# Patient Record
Sex: FEMALE | Race: OTHER | NOT HISPANIC OR LATINO
[De-identification: names, ages, dates, MRNs, and addresses within clinical notes are randomized per-mention and may not be internally consistent; named-entity substitution may affect disease eponyms.]

---

## 2017-02-17 ENCOUNTER — RECORD ABSTRACTING (OUTPATIENT)
Age: 27
End: 2017-02-17

## 2017-02-17 DIAGNOSIS — Z86.79 PERSONAL HISTORY OF OTHER DISEASES OF THE CIRCULATORY SYSTEM: ICD-10-CM

## 2017-02-17 DIAGNOSIS — Z87.59 PERSONAL HISTORY OF OTHER COMPLICATIONS OF PREGNANCY, CHILDBIRTH AND THE PUERPERIUM: ICD-10-CM

## 2017-02-17 DIAGNOSIS — Z82.49 FAMILY HISTORY OF ISCHEMIC HEART DISEASE AND OTHER DISEASES OF THE CIRCULATORY SYSTEM: ICD-10-CM

## 2017-02-17 DIAGNOSIS — O09.899 SUPERVISION OF OTHER HIGH RISK PREGNANCIES, UNSPECIFIED TRIMESTER: ICD-10-CM

## 2017-02-17 DIAGNOSIS — Z86.2 PERSONAL HISTORY OF DISEASES OF THE BLOOD AND BLOOD-FORMING ORGANS AND CERTAIN DISORDERS INVOLVING THE IMMUNE MECHANISM: ICD-10-CM

## 2017-02-17 DIAGNOSIS — Z78.9 OTHER SPECIFIED HEALTH STATUS: ICD-10-CM

## 2023-11-13 ENCOUNTER — APPOINTMENT (OUTPATIENT)
Dept: LAB | Facility: CLINIC | Age: 33
End: 2023-11-13

## 2023-11-13 DIAGNOSIS — Z23 ENCOUNTER FOR IMMUNIZATION: ICD-10-CM

## 2023-11-13 LAB — RUBV IGG SERPL IA-ACNC: 51 IU/ML

## 2023-11-13 PROCEDURE — 36415 COLL VENOUS BLD VENIPUNCTURE: CPT

## 2023-11-13 PROCEDURE — 86762 RUBELLA ANTIBODY: CPT

## 2023-11-13 PROCEDURE — 86765 RUBEOLA ANTIBODY: CPT

## 2023-11-14 LAB — MEV IGG SER QL IA: ABNORMAL

## 2023-12-01 ENCOUNTER — OCCMED (OUTPATIENT)
Dept: URGENT CARE | Facility: CLINIC | Age: 33
End: 2023-12-01

## 2023-12-01 DIAGNOSIS — Z23 NEED FOR IMMUNIZATION AGAINST COMBINATION OF INFECTIOUS DISEASES: Primary | ICD-10-CM

## 2024-10-12 ENCOUNTER — HOSPITAL ENCOUNTER (INPATIENT)
Facility: HOSPITAL | Age: 34
LOS: 3 days | Discharge: LEFT BEFORE DISCHARGE | DRG: 560 | End: 2024-10-15
Attending: STUDENT IN AN ORGANIZED HEALTH CARE EDUCATION/TRAINING PROGRAM | Admitting: STUDENT IN AN ORGANIZED HEALTH CARE EDUCATION/TRAINING PROGRAM
Payer: COMMERCIAL

## 2024-10-12 DIAGNOSIS — O14.10 SEVERE PRE-ECLAMPSIA, ANTEPARTUM: ICD-10-CM

## 2024-10-12 DIAGNOSIS — F41.9 ANXIETY: ICD-10-CM

## 2024-10-12 DIAGNOSIS — A53.0 POSITIVE RPR TEST: ICD-10-CM

## 2024-10-12 DIAGNOSIS — O09.30 NO PRENATAL CARE IN CURRENT PREGNANCY: Primary | ICD-10-CM

## 2024-10-12 DIAGNOSIS — A53.9 SYPHILIS: ICD-10-CM

## 2024-10-12 PROBLEM — Z87.59 HISTORY OF GESTATIONAL HYPERTENSION: Status: ACTIVE | Noted: 2024-10-12

## 2024-10-12 PROBLEM — R03.0 ELEVATED BLOOD PRESSURE READING WITHOUT DIAGNOSIS OF HYPERTENSION: Status: ACTIVE | Noted: 2024-10-12

## 2024-10-12 LAB
ABO GROUP BLD: NORMAL
ALBUMIN SERPL BCG-MCNC: 3.5 G/DL (ref 3.5–5)
ALP SERPL-CCNC: 154 U/L (ref 34–104)
ALT SERPL W P-5'-P-CCNC: 12 U/L (ref 7–52)
ANION GAP SERPL CALCULATED.3IONS-SCNC: 12 MMOL/L (ref 4–13)
AST SERPL W P-5'-P-CCNC: 14 U/L (ref 13–39)
BACTERIA UR QL AUTO: ABNORMAL /HPF
BILIRUB SERPL-MCNC: 0.59 MG/DL (ref 0.2–1)
BILIRUB UR QL STRIP: NEGATIVE
BLD GP AB SCN SERPL QL: NEGATIVE
BUN SERPL-MCNC: 8 MG/DL (ref 5–25)
CALCIUM SERPL-MCNC: 8.8 MG/DL (ref 8.4–10.2)
CHLORIDE SERPL-SCNC: 103 MMOL/L (ref 96–108)
CLARITY UR: CLEAR
CO2 SERPL-SCNC: 17 MMOL/L (ref 21–32)
COLOR UR: ABNORMAL
CREAT SERPL-MCNC: 0.48 MG/DL (ref 0.6–1.3)
ERYTHROCYTE [DISTWIDTH] IN BLOOD BY AUTOMATED COUNT: 14.3 % (ref 11.6–15.1)
FERRITIN SERPL-MCNC: 12 NG/ML (ref 11–307)
GFR SERPL CREATININE-BSD FRML MDRD: 128 ML/MIN/1.73SQ M
GLUCOSE P FAST SERPL-MCNC: 130 MG/DL (ref 65–99)
GLUCOSE SERPL-MCNC: 130 MG/DL (ref 65–140)
GLUCOSE UR STRIP-MCNC: ABNORMAL MG/DL
HCT VFR BLD AUTO: 32.9 % (ref 34.8–46.1)
HGB BLD-MCNC: 11.3 G/DL (ref 11.5–15.4)
HGB UR QL STRIP.AUTO: ABNORMAL
KETONES UR STRIP-MCNC: ABNORMAL MG/DL
LEUKOCYTE ESTERASE UR QL STRIP: NEGATIVE
MCH RBC QN AUTO: 25.1 PG (ref 26.8–34.3)
MCHC RBC AUTO-ENTMCNC: 34.3 G/DL (ref 31.4–37.4)
MCV RBC AUTO: 73 FL (ref 82–98)
NITRITE UR QL STRIP: NEGATIVE
NON-SQ EPI CELLS URNS QL MICRO: ABNORMAL /HPF
PH UR STRIP.AUTO: 5.5 [PH]
PLATELET # BLD AUTO: 322 THOUSANDS/UL (ref 149–390)
PMV BLD AUTO: 10.2 FL (ref 8.9–12.7)
POTASSIUM SERPL-SCNC: 3.7 MMOL/L (ref 3.5–5.3)
PROT SERPL-MCNC: 6.8 G/DL (ref 6.4–8.4)
PROT UR STRIP-MCNC: ABNORMAL MG/DL
RBC # BLD AUTO: 4.5 MILLION/UL (ref 3.81–5.12)
RBC #/AREA URNS AUTO: ABNORMAL /HPF
RH BLD: NEGATIVE
RUBV IGG SERPL IA-ACNC: 84.1 IU/ML
SODIUM SERPL-SCNC: 132 MMOL/L (ref 135–147)
SP GR UR STRIP.AUTO: 1.01 (ref 1–1.03)
SPECIMEN EXPIRATION DATE: NORMAL
UROBILINOGEN UR STRIP-ACNC: <2 MG/DL
WBC # BLD AUTO: 13.36 THOUSAND/UL (ref 4.31–10.16)
WBC #/AREA URNS AUTO: ABNORMAL /HPF

## 2024-10-12 PROCEDURE — 86803 HEPATITIS C AB TEST: CPT

## 2024-10-12 PROCEDURE — 86706 HEP B SURFACE ANTIBODY: CPT

## 2024-10-12 PROCEDURE — 87389 HIV-1 AG W/HIV-1&-2 AB AG IA: CPT

## 2024-10-12 PROCEDURE — 86850 RBC ANTIBODY SCREEN: CPT

## 2024-10-12 PROCEDURE — 86901 BLOOD TYPING SEROLOGIC RH(D): CPT

## 2024-10-12 PROCEDURE — 59414 DELIVER PLACENTA: CPT | Performed by: STUDENT IN AN ORGANIZED HEALTH CARE EDUCATION/TRAINING PROGRAM

## 2024-10-12 PROCEDURE — 86593 SYPHILIS TEST NON-TREP QUANT: CPT

## 2024-10-12 PROCEDURE — 86900 BLOOD TYPING SEROLOGIC ABO: CPT

## 2024-10-12 PROCEDURE — 88307 TISSUE EXAM BY PATHOLOGIST: CPT | Performed by: PATHOLOGY

## 2024-10-12 PROCEDURE — 87150 DNA/RNA AMPLIFIED PROBE: CPT | Performed by: STUDENT IN AN ORGANIZED HEALTH CARE EDUCATION/TRAINING PROGRAM

## 2024-10-12 PROCEDURE — 86592 SYPHILIS TEST NON-TREP QUAL: CPT

## 2024-10-12 PROCEDURE — 87340 HEPATITIS B SURFACE AG IA: CPT

## 2024-10-12 PROCEDURE — 86780 TREPONEMA PALLIDUM: CPT

## 2024-10-12 PROCEDURE — 87591 N.GONORRHOEAE DNA AMP PROB: CPT

## 2024-10-12 PROCEDURE — 85027 COMPLETE CBC AUTOMATED: CPT

## 2024-10-12 PROCEDURE — 87086 URINE CULTURE/COLONY COUNT: CPT

## 2024-10-12 PROCEDURE — 81001 URINALYSIS AUTO W/SCOPE: CPT

## 2024-10-12 PROCEDURE — 80053 COMPREHEN METABOLIC PANEL: CPT

## 2024-10-12 PROCEDURE — 99222 1ST HOSP IP/OBS MODERATE 55: CPT | Performed by: STUDENT IN AN ORGANIZED HEALTH CARE EDUCATION/TRAINING PROGRAM

## 2024-10-12 PROCEDURE — 87491 CHLMYD TRACH DNA AMP PROBE: CPT

## 2024-10-12 PROCEDURE — 86762 RUBELLA ANTIBODY: CPT

## 2024-10-12 PROCEDURE — 82728 ASSAY OF FERRITIN: CPT

## 2024-10-12 RX ORDER — MAGNESIUM SULFATE HEPTAHYDRATE 40 MG/ML
4 INJECTION, SOLUTION INTRAVENOUS ONCE
Status: COMPLETED | OUTPATIENT
Start: 2024-10-12 | End: 2024-10-13

## 2024-10-12 RX ORDER — SODIUM CHLORIDE, SODIUM LACTATE, POTASSIUM CHLORIDE, CALCIUM CHLORIDE 600; 310; 30; 20 MG/100ML; MG/100ML; MG/100ML; MG/100ML
50 INJECTION, SOLUTION INTRAVENOUS CONTINUOUS
Status: DISCONTINUED | OUTPATIENT
Start: 2024-10-12 | End: 2024-10-15

## 2024-10-12 RX ORDER — MAGNESIUM SULFATE HEPTAHYDRATE 40 MG/ML
2 INJECTION, SOLUTION INTRAVENOUS ONCE
Status: COMPLETED | OUTPATIENT
Start: 2024-10-12 | End: 2024-10-13

## 2024-10-12 RX ORDER — IBUPROFEN 600 MG/1
600 TABLET, FILM COATED ORAL EVERY 6 HOURS
Status: DISCONTINUED | OUTPATIENT
Start: 2024-10-12 | End: 2024-10-15 | Stop reason: HOSPADM

## 2024-10-12 RX ORDER — OXYTOCIN 10 [USP'U]/ML
10 INJECTION, SOLUTION INTRAMUSCULAR; INTRAVENOUS ONCE
Status: COMPLETED | OUTPATIENT
Start: 2024-10-12 | End: 2024-10-12

## 2024-10-12 RX ORDER — ACETAMINOPHEN 325 MG/1
650 TABLET ORAL EVERY 4 HOURS PRN
Status: DISCONTINUED | OUTPATIENT
Start: 2024-10-12 | End: 2024-10-15

## 2024-10-12 RX ORDER — OXYTOCIN 10 [USP'U]/ML
INJECTION, SOLUTION INTRAMUSCULAR; INTRAVENOUS
Status: DISCONTINUED
Start: 2024-10-12 | End: 2024-10-12

## 2024-10-12 RX ORDER — DIPHENHYDRAMINE HCL 25 MG
25 TABLET ORAL EVERY 6 HOURS PRN
Status: DISCONTINUED | OUTPATIENT
Start: 2024-10-12 | End: 2024-10-15 | Stop reason: HOSPADM

## 2024-10-12 RX ORDER — ONDANSETRON 2 MG/ML
4 INJECTION INTRAMUSCULAR; INTRAVENOUS EVERY 8 HOURS PRN
Status: DISCONTINUED | OUTPATIENT
Start: 2024-10-12 | End: 2024-10-15 | Stop reason: HOSPADM

## 2024-10-12 RX ORDER — MAGNESIUM SULFATE HEPTAHYDRATE 40 MG/ML
2 INJECTION, SOLUTION INTRAVENOUS CONTINUOUS
Status: DISPENSED | OUTPATIENT
Start: 2024-10-12 | End: 2024-10-13

## 2024-10-12 RX ORDER — CALCIUM GLUCONATE 94 MG/ML
1 INJECTION, SOLUTION INTRAVENOUS ONCE AS NEEDED
Status: DISCONTINUED | OUTPATIENT
Start: 2024-10-12 | End: 2024-10-15 | Stop reason: HOSPADM

## 2024-10-12 RX ORDER — CALCIUM CARBONATE 500 MG/1
1000 TABLET, CHEWABLE ORAL DAILY PRN
Status: DISCONTINUED | OUTPATIENT
Start: 2024-10-12 | End: 2024-10-15 | Stop reason: HOSPADM

## 2024-10-12 RX ORDER — DOCUSATE SODIUM 100 MG/1
100 CAPSULE, LIQUID FILLED ORAL 2 TIMES DAILY
Status: DISCONTINUED | OUTPATIENT
Start: 2024-10-12 | End: 2024-10-15 | Stop reason: HOSPADM

## 2024-10-12 RX ORDER — SIMETHICONE 80 MG
80 TABLET,CHEWABLE ORAL 4 TIMES DAILY PRN
Status: DISCONTINUED | OUTPATIENT
Start: 2024-10-12 | End: 2024-10-15 | Stop reason: HOSPADM

## 2024-10-12 RX ORDER — BENZOCAINE/MENTHOL 6 MG-10 MG
1 LOZENGE MUCOUS MEMBRANE DAILY PRN
Status: DISCONTINUED | OUTPATIENT
Start: 2024-10-12 | End: 2024-10-15 | Stop reason: HOSPADM

## 2024-10-12 RX ORDER — LABETALOL HYDROCHLORIDE 5 MG/ML
20 INJECTION, SOLUTION INTRAVENOUS ONCE
Status: COMPLETED | OUTPATIENT
Start: 2024-10-12 | End: 2024-10-12

## 2024-10-12 RX ADMIN — WITCH HAZEL 1 PAD: 500 SOLUTION RECTAL; TOPICAL at 23:40

## 2024-10-12 RX ADMIN — IBUPROFEN 600 MG: 600 TABLET, FILM COATED ORAL at 19:50

## 2024-10-12 RX ADMIN — OXYTOCIN 10 UNITS: 10 INJECTION INTRAVENOUS at 17:10

## 2024-10-12 RX ADMIN — OXYTOCIN 10 UNITS: 10 INJECTION, SOLUTION INTRAMUSCULAR; INTRAVENOUS at 17:10

## 2024-10-12 RX ADMIN — MAGNESIUM SULFATE HEPTAHYDRATE 4 G: 40 INJECTION, SOLUTION INTRAVENOUS at 20:09

## 2024-10-12 RX ADMIN — BENZOCAINE AND LEVOMENTHOL 1 APPLICATION: 200; 5 SPRAY TOPICAL at 23:40

## 2024-10-12 RX ADMIN — HYDROCORTISONE 1 APPLICATION: 1 CREAM TOPICAL at 23:40

## 2024-10-12 RX ADMIN — MAGNESIUM SULFATE HEPTAHYDRATE 2 G/HR: 40 INJECTION, SOLUTION INTRAVENOUS at 20:32

## 2024-10-12 RX ADMIN — LABETALOL HYDROCHLORIDE 20 MG: 5 INJECTION, SOLUTION INTRAVENOUS at 19:46

## 2024-10-12 RX ADMIN — DOCUSATE SODIUM 100 MG: 100 CAPSULE, LIQUID FILLED ORAL at 19:50

## 2024-10-12 RX ADMIN — MAGNESIUM SULFATE HEPTAHYDRATE 2 G: 40 INJECTION, SOLUTION INTRAVENOUS at 19:55

## 2024-10-12 RX ADMIN — SODIUM CHLORIDE, SODIUM LACTATE, POTASSIUM CHLORIDE, AND CALCIUM CHLORIDE 50 ML/HR: .6; .31; .03; .02 INJECTION, SOLUTION INTRAVENOUS at 19:54

## 2024-10-12 NOTE — H&P
H & P- Obstetrics   Hermelinda Smart 34 y.o. female MRN: [id number]  Unit/Bed#: -01 Encounter: [id number]    Assessment: 34 y.o.  at Unknown admitted for S/p home birth brought by EMS with retained placenta no hemorrhage.  GBS status: Unknown   Postpartum contraception plan: Undecided    Plan:   No prenatal care in current pregnancy  Assessment & Plan  Prenatal labs ordered during admission    History of gestational hypertension  Assessment & Plan  BP monitoring  CBC, CMP, UPC ordered during admission    *  (spontaneous vaginal delivery)  Assessment & Plan  Continue routine post partum care  Encourage ambulation  Encourage breastfeeding  Contraception: Undecided  Anticipate discharge PPD 1 vs 2             Discussed case and plan w/ Dr. Lawrence      Chief Complaint: s/p Home birth    HPI: Hermelinda Smart is a 34 y.o.  with an JOSE of Not found. at Unknown who is being admitted for s/p home birth brought by EMS with retained placenta no hemorrhage.. She endorses that she didn't know she was pregnant, has  history of two previous vaginal deliveries and pre eclampsia and abnormal glucose during pregnancies .     Patient Active Problem List   Diagnosis    History of gestational hypertension    Severe preeclampsia    No prenatal care in current pregnancy     (spontaneous vaginal delivery)       Baby complications/comments: unknown    Review of Systems   Constitutional:  Negative for chills and fever.   HENT:  Negative for ear pain and sore throat.    Eyes:  Negative for pain and visual disturbance.   Respiratory:  Negative for cough and shortness of breath.    Cardiovascular:  Negative for chest pain and palpitations.   Gastrointestinal:  Negative for abdominal pain, nausea and vomiting.   Genitourinary:  Negative for dysuria, hematuria and vaginal bleeding.   Musculoskeletal:  Negative for arthralgias and back pain.   Skin:  Negative for color change and rash.   Neurological:  Negative for  seizures, syncope and headaches.   All other systems reviewed and are negative.      OB Hx:  OB History    Para Term  AB Living   1             SAB IAB Ectopic Multiple Live Births                  # Outcome Date GA Lbr Adolfo/2nd Weight Sex Type Anes PTL Lv   1 Current                Past Medical Hx:  Past Medical History:   Diagnosis Date    Hypertension        Past Surgical hx:  History reviewed. No pertinent surgical history.    Social Hx:  Alcohol use: No  Tobacco use: No   Other substance use: No    No medications prior to admission.    Objective:  Temp:  [98.2 °F (36.8 °C)] 98.2 °F (36.8 °C)  HR:  [] 95  BP: (131-186)/() 143/72  Resp:  [18] 18  O2 Device: None (Room air)  Body mass index is 38.59 kg/m².     Physical Exam:  Physical Exam  Constitutional:       Appearance: Normal appearance.   Genitourinary:      Vulva normal.   Cardiovascular:      Rate and Rhythm: Normal rate and regular rhythm.   Pulmonary:      Effort: Pulmonary effort is normal. No respiratory distress.   Abdominal:      Palpations: Abdomen is soft.      Tenderness: There is no abdominal tenderness.   Musculoskeletal:      Cervical back: Normal range of motion.   Neurological:      Mental Status: She is alert.   Skin:     General: Skin is warm and dry.   Psychiatric:         Mood and Affect: Mood normal.         Behavior: Behavior normal.   Vitals reviewed.          Lab Results   Component Value Date    WBC 13.36 (H) 10/12/2024    HGB 11.3 (L) 10/12/2024    HCT 32.9 (L) 10/12/2024     10/12/2024     Lab Results   Component Value Date    K 3.7 10/12/2024     10/12/2024    CO2 17 (L) 10/12/2024    BUN 8 10/12/2024    CREATININE 0.48 (L) 10/12/2024    AST 14 10/12/2024    ALT 12 10/12/2024     Prenatal Labs: Reviewed      Blood type: A negative  Antibody: Negative  GBS: Unknown  HIV: Unknown  Rubella: Unknown  Syphilis IgM/IgG: Unknown  HBsAg: Unknown  HCAb: Unknown  Chlamydia: Unknown  Gonorrhea:  Unknown  Diabetes 1 hour screen: Unknown  3 hour glucose: Unknown  Platelets: 322  Hgb: 11.3  <2 Midnights  INPATIENT     Signature/Title: Jasmin Vargas MD  Date: 10/12/2024  Time: 9:55 PM

## 2024-10-12 NOTE — PROGRESS NOTES
Notified by RN of sustained severe range BP, c/w new diagnosis of PreE w/ SF.  Currently she denies headache, vision changes, chest pain, SOB, RUQ/epigastric pain, or increased swelling.  HR 94 bpm.  Will give Labetalol 20 mg IV once now and start Magnesium gtt for seizure ppx with labs (CBC, CMP, Mag) q6h (next due at midnight).    Reviewed w/ Dr. Howard Ryan MD  OBGYN Resident  10/12/24  7:34 PM

## 2024-10-13 LAB
ABO GROUP BLD: NORMAL
ABO GROUP BLD: NORMAL
ALBUMIN SERPL BCG-MCNC: 3.2 G/DL (ref 3.5–5)
ALBUMIN SERPL BCG-MCNC: 3.4 G/DL (ref 3.5–5)
ALBUMIN SERPL BCG-MCNC: 3.4 G/DL (ref 3.5–5)
ALP SERPL-CCNC: 127 U/L (ref 34–104)
ALP SERPL-CCNC: 129 U/L (ref 34–104)
ALP SERPL-CCNC: 147 U/L (ref 34–104)
ALT SERPL W P-5'-P-CCNC: 11 U/L (ref 7–52)
ALT SERPL W P-5'-P-CCNC: 12 U/L (ref 7–52)
ALT SERPL W P-5'-P-CCNC: 9 U/L (ref 7–52)
ANION GAP SERPL CALCULATED.3IONS-SCNC: 11 MMOL/L (ref 4–13)
ANION GAP SERPL CALCULATED.3IONS-SCNC: 11 MMOL/L (ref 4–13)
ANION GAP SERPL CALCULATED.3IONS-SCNC: 8 MMOL/L (ref 4–13)
AST SERPL W P-5'-P-CCNC: 16 U/L (ref 13–39)
AST SERPL W P-5'-P-CCNC: 16 U/L (ref 13–39)
AST SERPL W P-5'-P-CCNC: 17 U/L (ref 13–39)
BILIRUB SERPL-MCNC: 0.58 MG/DL (ref 0.2–1)
BILIRUB SERPL-MCNC: 0.72 MG/DL (ref 0.2–1)
BILIRUB SERPL-MCNC: 0.75 MG/DL (ref 0.2–1)
BLD GP AB SCN SERPL QL: NEGATIVE
BUN SERPL-MCNC: 6 MG/DL (ref 5–25)
BUN SERPL-MCNC: 6 MG/DL (ref 5–25)
BUN SERPL-MCNC: 8 MG/DL (ref 5–25)
CALCIUM ALBUM COR SERPL-MCNC: 8.2 MG/DL (ref 8.3–10.1)
CALCIUM ALBUM COR SERPL-MCNC: 8.3 MG/DL (ref 8.3–10.1)
CALCIUM ALBUM COR SERPL-MCNC: 9.1 MG/DL (ref 8.3–10.1)
CALCIUM SERPL-MCNC: 7.7 MG/DL (ref 8.4–10.2)
CALCIUM SERPL-MCNC: 7.7 MG/DL (ref 8.4–10.2)
CALCIUM SERPL-MCNC: 8.6 MG/DL (ref 8.4–10.2)
CHLORIDE SERPL-SCNC: 104 MMOL/L (ref 96–108)
CHLORIDE SERPL-SCNC: 105 MMOL/L (ref 96–108)
CHLORIDE SERPL-SCNC: 105 MMOL/L (ref 96–108)
CO2 SERPL-SCNC: 15 MMOL/L (ref 21–32)
CO2 SERPL-SCNC: 18 MMOL/L (ref 21–32)
CO2 SERPL-SCNC: 20 MMOL/L (ref 21–32)
CREAT SERPL-MCNC: 0.49 MG/DL (ref 0.6–1.3)
CREAT SERPL-MCNC: 0.51 MG/DL (ref 0.6–1.3)
CREAT SERPL-MCNC: 0.55 MG/DL (ref 0.6–1.3)
ERYTHROCYTE [DISTWIDTH] IN BLOOD BY AUTOMATED COUNT: 14.3 % (ref 11.6–15.1)
ERYTHROCYTE [DISTWIDTH] IN BLOOD BY AUTOMATED COUNT: 14.4 % (ref 11.6–15.1)
ERYTHROCYTE [DISTWIDTH] IN BLOOD BY AUTOMATED COUNT: 14.5 % (ref 11.6–15.1)
FETAL CELL SCN BLD QL ROSETTE: NEGATIVE
GFR SERPL CREATININE-BSD FRML MDRD: 122 ML/MIN/1.73SQ M
GFR SERPL CREATININE-BSD FRML MDRD: 125 ML/MIN/1.73SQ M
GFR SERPL CREATININE-BSD FRML MDRD: 127 ML/MIN/1.73SQ M
GLUCOSE SERPL-MCNC: 110 MG/DL (ref 65–140)
GLUCOSE SERPL-MCNC: 129 MG/DL (ref 65–140)
GLUCOSE SERPL-MCNC: 136 MG/DL (ref 65–140)
HBV SURFACE AB SER-ACNC: 5.74 MIU/ML
HBV SURFACE AG SER QL: NORMAL
HCT VFR BLD AUTO: 30.4 % (ref 34.8–46.1)
HCT VFR BLD AUTO: 31.6 % (ref 34.8–46.1)
HCT VFR BLD AUTO: 31.9 % (ref 34.8–46.1)
HCV AB SER QL: NORMAL
HGB BLD-MCNC: 10.6 G/DL (ref 11.5–15.4)
HGB BLD-MCNC: 10.8 G/DL (ref 11.5–15.4)
HGB BLD-MCNC: 10.8 G/DL (ref 11.5–15.4)
HIV 1+2 AB+HIV1 P24 AG SERPL QL IA: NORMAL
HIV 1+2 AB+HIV1 P24 AG SERPL QL IA: NORMAL
HIV 2 AB SERPL QL IA: NORMAL
HIV1 AB SERPL QL IA: NORMAL
HIV1 P24 AG SER QL: NORMAL
HIV1 P24 AG SERPL QL IA: NORMAL
MAGNESIUM SERPL-MCNC: 4 MG/DL (ref 1.9–2.7)
MAGNESIUM SERPL-MCNC: 4.6 MG/DL (ref 1.9–2.7)
MAGNESIUM SERPL-MCNC: 4.7 MG/DL (ref 1.9–2.7)
MCH RBC QN AUTO: 24.8 PG (ref 26.8–34.3)
MCH RBC QN AUTO: 24.9 PG (ref 26.8–34.3)
MCH RBC QN AUTO: 25.2 PG (ref 26.8–34.3)
MCHC RBC AUTO-ENTMCNC: 33.9 G/DL (ref 31.4–37.4)
MCHC RBC AUTO-ENTMCNC: 34.2 G/DL (ref 31.4–37.4)
MCHC RBC AUTO-ENTMCNC: 34.9 G/DL (ref 31.4–37.4)
MCV RBC AUTO: 72 FL (ref 82–98)
MCV RBC AUTO: 73 FL (ref 82–98)
MCV RBC AUTO: 74 FL (ref 82–98)
PLATELET # BLD AUTO: 319 THOUSANDS/UL (ref 149–390)
PLATELET # BLD AUTO: 346 THOUSANDS/UL (ref 149–390)
PLATELET # BLD AUTO: 359 THOUSANDS/UL (ref 149–390)
PMV BLD AUTO: 10.2 FL (ref 8.9–12.7)
PMV BLD AUTO: 10.7 FL (ref 8.9–12.7)
PMV BLD AUTO: 10.8 FL (ref 8.9–12.7)
POTASSIUM SERPL-SCNC: 3.9 MMOL/L (ref 3.5–5.3)
POTASSIUM SERPL-SCNC: 4 MMOL/L (ref 3.5–5.3)
POTASSIUM SERPL-SCNC: 4 MMOL/L (ref 3.5–5.3)
PROT SERPL-MCNC: 6.5 G/DL (ref 6.4–8.4)
PROT SERPL-MCNC: 6.6 G/DL (ref 6.4–8.4)
PROT SERPL-MCNC: 6.7 G/DL (ref 6.4–8.4)
RBC # BLD AUTO: 4.2 MILLION/UL (ref 3.81–5.12)
RBC # BLD AUTO: 4.34 MILLION/UL (ref 3.81–5.12)
RBC # BLD AUTO: 4.36 MILLION/UL (ref 3.81–5.12)
RH BLD: NEGATIVE
RH BLD: NEGATIVE
SODIUM SERPL-SCNC: 131 MMOL/L (ref 135–147)
SODIUM SERPL-SCNC: 133 MMOL/L (ref 135–147)
SODIUM SERPL-SCNC: 133 MMOL/L (ref 135–147)
TREPONEMA PALLIDUM IGG+IGM AB [PRESENCE] IN SERUM OR PLASMA BY IMMUNOASSAY: REACTIVE
WBC # BLD AUTO: 11.08 THOUSAND/UL (ref 4.31–10.16)
WBC # BLD AUTO: 11.94 THOUSAND/UL (ref 4.31–10.16)
WBC # BLD AUTO: 9.03 THOUSAND/UL (ref 4.31–10.16)

## 2024-10-13 PROCEDURE — 80053 COMPREHEN METABOLIC PANEL: CPT

## 2024-10-13 PROCEDURE — 85461 HEMOGLOBIN FETAL: CPT

## 2024-10-13 PROCEDURE — 86780 TREPONEMA PALLIDUM: CPT

## 2024-10-13 PROCEDURE — 87806 HIV AG W/HIV1&2 ANTB W/OPTIC: CPT

## 2024-10-13 PROCEDURE — 83735 ASSAY OF MAGNESIUM: CPT

## 2024-10-13 PROCEDURE — 85027 COMPLETE CBC AUTOMATED: CPT

## 2024-10-13 PROCEDURE — 99232 SBSQ HOSP IP/OBS MODERATE 35: CPT | Performed by: OBSTETRICS & GYNECOLOGY

## 2024-10-13 PROCEDURE — 86901 BLOOD TYPING SEROLOGIC RH(D): CPT

## 2024-10-13 PROCEDURE — 86900 BLOOD TYPING SEROLOGIC ABO: CPT

## 2024-10-13 PROCEDURE — 86850 RBC ANTIBODY SCREEN: CPT

## 2024-10-13 RX ORDER — LABETALOL HYDROCHLORIDE 5 MG/ML
40 INJECTION, SOLUTION INTRAVENOUS ONCE
Status: COMPLETED | OUTPATIENT
Start: 2024-10-13 | End: 2024-10-13

## 2024-10-13 RX ORDER — NIFEDIPINE 30 MG/1
30 TABLET, EXTENDED RELEASE ORAL DAILY
Status: DISCONTINUED | OUTPATIENT
Start: 2024-10-13 | End: 2024-10-14

## 2024-10-13 RX ORDER — LABETALOL HYDROCHLORIDE 5 MG/ML
20 INJECTION, SOLUTION INTRAVENOUS ONCE
Status: COMPLETED | OUTPATIENT
Start: 2024-10-13 | End: 2024-10-13

## 2024-10-13 RX ADMIN — IBUPROFEN 600 MG: 600 TABLET, FILM COATED ORAL at 03:40

## 2024-10-13 RX ADMIN — DOCUSATE SODIUM 100 MG: 100 CAPSULE, LIQUID FILLED ORAL at 18:21

## 2024-10-13 RX ADMIN — IBUPROFEN 600 MG: 600 TABLET, FILM COATED ORAL at 13:49

## 2024-10-13 RX ADMIN — HUMAN RHO(D) IMMUNE GLOBULIN 300 MCG: 300 INJECTION, SOLUTION INTRAMUSCULAR at 06:43

## 2024-10-13 RX ADMIN — MAGNESIUM SULFATE HEPTAHYDRATE 2 G/HR: 40 INJECTION, SOLUTION INTRAVENOUS at 05:45

## 2024-10-13 RX ADMIN — LABETALOL HYDROCHLORIDE 40 MG: 5 INJECTION, SOLUTION INTRAVENOUS at 22:07

## 2024-10-13 RX ADMIN — IBUPROFEN 600 MG: 600 TABLET, FILM COATED ORAL at 21:13

## 2024-10-13 RX ADMIN — NIFEDIPINE 30 MG: 30 TABLET, FILM COATED, EXTENDED RELEASE ORAL at 21:13

## 2024-10-13 RX ADMIN — LABETALOL HYDROCHLORIDE 20 MG: 5 INJECTION, SOLUTION INTRAVENOUS at 20:53

## 2024-10-13 RX ADMIN — MAGNESIUM SULFATE HEPTAHYDRATE 2 G/HR: 40 INJECTION, SOLUTION INTRAVENOUS at 14:47

## 2024-10-13 RX ADMIN — SODIUM CHLORIDE, SODIUM LACTATE, POTASSIUM CHLORIDE, AND CALCIUM CHLORIDE 50 ML/HR: .6; .31; .03; .02 INJECTION, SOLUTION INTRAVENOUS at 14:46

## 2024-10-13 NOTE — PROGRESS NOTES
"  Progress Note - OB/GYN   Name: Hermelinda Smart 34 y.o. female I MRN: [id number]  Unit/Bed#: -01 I Date of Admission: 10/12/2024   Date of Service: 10/13/2024 I Hospital Day: 1     Assessment & Plan   (spontaneous vaginal delivery)  Continue routine post partum care  Encourage ambulation  Encourage breastfeeding  Contraception: Undecided  Anticipate discharge PPD 1 vs 2     History of gestational hypertension  BP monitoring  CBC, CMP, UPC ordered during admission  Severe preeclampsia  Mag started at 1555 10/12/2024  BP monitoring  No prenatal care in current pregnancy  Prenatal labs ordered during admission    Progress Note - OB/GYN   Hermelinda Smart 34 y.o. female MRN: [id number]  Unit/Bed#: -01 Encounter: [id number]    Assessment:  Post partum Day #01 s/p extramural , stable, baby in the room. Patient is on Mag for 24 hours, ends at 1555 10/13/2024.    Subjective/Objective   Chief Complaint:     Post delivery. Patient is doing well. Lochia WNL. Pain well controlled.     Subjective:     Pain: yes, cramping, improved with meds  Tolerating PO: yes  Voiding: yes  Flatus: no  Ambulating: yes  Chest pain: no  Shortness of breath: no  Leg pain: no  Lochia: minimal    Objective:     Vitals: /70   Pulse 86   Temp 98.2 °F (36.8 °C) (Oral)   Resp 18   Ht 5' 2\" (1.575 m)   Wt 95.7 kg (211 lb)   Breastfeeding No   BMI 38.59 kg/m²     I/O         10/11 0701  10/12 0700 10/12 0701  10/13 07    Urine (mL/kg/hr)  600    Total Output  600    Net  -600                  Lab Results   Component Value Date    WBC 13.36 (H) 10/12/2024    HGB 11.3 (L) 10/12/2024    HCT 32.9 (L) 10/12/2024    MCV 73 (L) 10/12/2024     10/12/2024       Physical Exam:     Gen: AAOx3, NAD  CV: no acute distress  Lungs: no acute distress  Abd: Soft, non-tender, non-distended, no rebound or guarding  Uterine fundus firm and non-tender, 1 cm below the umbilicus.  Ext: Non tender    Inaldo Ryan Alicia, " MD [facility] PGY-1  10/13/2024  12:03 AM

## 2024-10-13 NOTE — CASE MANAGEMENT
Case Management Progress Note    Patient name Hermelinda Smart  Location /-01 MRN [id number]  : 1990 Date 10/13/2024       LOS (days): 1  Geometric Mean LOS (GMLOS) (days):   Days to GMLOS:        OBJECTIVE:        Current admission status: Inpatient  Preferred Pharmacy: No Pharmacies Listed  Primary Care Provider: No primary care provider on file.    Primary Insurance: [employer] [facility]  Secondary Insurance:     PROGRESS NOTE:        CM met with MOB to introduce CM services, complete assessment, and provide CM contact info.      MOB reported the following:    Assessment:  Consult reason: Inadequate Prenatal Care  Gestational Age at Birth: 37 Weeks + 0 Days  MOB Name (& age if teen):   Hermelinda Smart  FOB Name (& age if teen MOB):   [place]   Other Legal Guardian(s) for Baby:      Other Children:   2 other children  Housing Plan/Lives with:   MOB, FOB, and children  Insurance Coverage/Plan for Baby: MOB verbalizes that they will contact their insurance to add baby ASAP.   Support System: Family, Friends, and Spouse/Significant Other  Care Items: Car Seat, Crib/Bassinet (Safe Sleep Space), Diapers/Wipes, and Clothing Family getting car seat today  Method of Feeding: Formula  Breast Pump: Declines need for breast pump  Government Assistance Programs: SNAP (Supplemental Nutrition Assistance Program)   Arrangements:   Current Employment/Schooling: MOB employed Part Time and FOB employed Full Time  Mental Health History and/or Treatment:   Denies  Substance Use History and/or Treatment: Denies    Urine Drug Screen Results: Not Applicable  Children & Youth History: None  Current Legal Issues: N/A  Domestic/Intimate Partner Violence History: Denies.  NICU Resources: N/A    Discharge Plan:  Pediatrician:   [facility] [place]  Prenatal/ Care:  TBD  Follow-Up Appointments Needed/Scheduled: TBD  Medications/DME/Other Referrals: TBD  Transportation Plan: Family has a  vehicle    Follow-Up Needed from Care Management:         Cm met with MOB to review Cm role. MOB did not know she was pregnant so did not receive prenatal care. MOB plans on taking baby to [facility] in [place]  for prenatal care- no appointment scheduled yet. MOB denies any need for services or resources. She reports having all necessary items for baby. Family is going to get car seat today. She denies any needs or concerns at this time. MOB and baby cleared of CM needs at this time.

## 2024-10-13 NOTE — QUICK NOTE
Hermelinda Smart is a 34 y.o.  with an JOSE of Not found. at Unknown who is being admitted for s/p home birth brought by EMS with retained placenta no hemorrhage.. She endorses that she didn't know she was pregnant, has  history of two previous vaginal deliveries and pre eclampsia and abnormal glucose during pregnancies .   Patient was in supine position, placenta was spontaneously removed. The vagina, cervix, perineum, and rectum were inspected. No laceration(s) were noted.      At the conclusion of the procedure, all needle, sponge, and instrument counts were noted to be correct. Patient tolerated the procedure well and was allowed to recover in labor and delivery room and while  is being transferred to this campus. When stable pt will go to the post-partum floor.      Dr. Lawrence was present and participated in all key portions of the case.    Jasmin Garcia  OB GYN PGY1  10/12/24  10:05 PM

## 2024-10-13 NOTE — PLAN OF CARE

## 2024-10-13 NOTE — ASSESSMENT & PLAN NOTE
Continue routine post partum care  Encourage ambulation  Encourage breastfeeding  Contraception: Undecided  Anticipate discharge PPD 1 vs 2

## 2024-10-13 NOTE — PLAN OF CARE
Problem: POSTPARTUM  Goal: Experiences normal postpartum course  Description: INTERVENTIONS:  - Monitor maternal vital signs  - Assess uterine involution and lochia  Outcome: Progressing  Goal: Appropriate maternal -  bonding  Description: INTERVENTIONS:  - Identify family support  - Assess for appropriate maternal/infant bonding   -Encourage maternal/infant bonding opportunities  - Referral to  or  as needed  Outcome: Progressing  Goal: Establishment of infant feeding pattern  Description: INTERVENTIONS:  - Assess breast/bottle feeding  - Refer to lactation as needed  Outcome: Progressing  Goal: Incision(s), wounds(s) or drain site(s) healing without S/S of infection  Description: INTERVENTIONS  - Assess and document dressing, incision, wound bed, drain sites and surrounding tissue  - Provide patient and family education  - Perform skin care/dressing changes every day  Outcome: Progressing     Problem: PAIN - ADULT  Goal: Verbalizes/displays adequate comfort level or baseline comfort level  Description: Interventions:  - Encourage patient to monitor pain and request assistance  - Assess pain using appropriate pain scale  - Administer analgesics based on type and severity of pain and evaluate response  - Implement non-pharmacological measures as appropriate and evaluate response  - Consider cultural and social influences on pain and pain management  - Notify physician/advanced practitioner if interventions unsuccessful or patient reports new pain  Outcome: Progressing     Problem: INFECTION - ADULT  Goal: Absence or prevention of progression during hospitalization  Description: INTERVENTIONS:  - Assess and monitor for signs and symptoms of infection  - Monitor lab/diagnostic results  - Monitor all insertion sites, i.e. indwelling lines, tubes, and drains  - Monitor endotracheal if appropriate and nasal secretions for changes in amount and color  - [place] appropriate  cooling/warming therapies per order  - Administer medications as ordered  - Instruct and encourage patient and family to use good hand hygiene technique  - Identify and instruct in appropriate isolation precautions for identified infection/condition  Outcome: Progressing  Goal: Absence of fever/infection during neutropenic period  Description: INTERVENTIONS:  - Monitor WBC    Outcome: Progressing     Problem: SAFETY ADULT  Goal: Patient will remain free of falls  Description: INTERVENTIONS:  - Educate patient/family on patient safety including physical limitations  - Instruct patient to call for assistance with activity   - Consult OT/PT to assist with strengthening/mobility   - Keep Call bell within reach  - Keep bed low and locked with side rails adjusted as appropriate  - Keep care items and personal belongings within reach  - Initiate and maintain comfort rounds    Outcome: Progressing  Goal: Maintain or return to baseline ADL function  Description: INTERVENTIONS:  -  Assess patient's ability to carry out ADLs; assess patient's baseline for ADL function and identify physical deficits which impact ability to perform ADLs (bathing, care of mouth/teeth, toileting, grooming, dressing, etc.)  - Assess/evaluate cause of self-care deficits   - Assess range of motion  - Assess patient's mobility; develop plan if impaired  - Assess patient's need for assistive devices and provide as appropriate  - Encourage maximum independence but intervene and supervise when necessary  - Involve family in performance of ADLs  - Assess for home care needs following discharge   - Consider OT consult to assist with ADL evaluation and planning for discharge  - Provide patient education as appropriate  Outcome: Progressing  Goal: Maintains/Returns to pre admission functional level  Description: INTERVENTIONS:  - Perform AM-PAC 6 Click Basic Mobility/ Daily Activity assessment daily.  - Set and communicate daily mobility goal to care team  and patient/family/caregiver.   - Collaborate with rehabilitation services on mobility goals if consulted    - Out of bed for toileting  - Record patient progress and toleration of activity level   Outcome: Progressing     Problem: Knowledge Deficit  Goal: Patient/family/caregiver demonstrates understanding of disease process, treatment plan, medications, and discharge instructions  Description: Complete learning assessment and assess knowledge base.  Interventions:  - Provide teaching at level of understanding  - Provide teaching via preferred learning methods  Outcome: Progressing     Problem: DISCHARGE PLANNING  Goal: Discharge to home or other facility with appropriate resources  Description: INTERVENTIONS:  - Identify barriers to discharge w/patient and caregiver  - Arrange for needed discharge resources and transportation as appropriate  - Identify discharge learning needs (meds, wound care, etc.)  - Arrange for interpretive services to assist at discharge as needed  - Refer to Case Management Department for coordinating discharge planning if the patient needs post-hospital services based on physician/advanced practitioner order or complex needs related to functional status, cognitive ability, or social support system  Outcome: Progressing     Problem: CARDIOVASCULAR - ADULT  Goal: Maintains optimal cardiac output and hemodynamic stability  Description: INTERVENTIONS:  - Monitor I/O, vital signs and rhythm  - Monitor for S/S and trends of decreased cardiac output  - Administer and titrate ordered vasoactive medications to optimize hemodynamic stability  - Assess quality of pulses, skin color and temperature    - Instruct patient to report change in severity of symptoms  Outcome: Progressing     Problem: METABOLIC, FLUID AND ELECTROLYTES - ADULT  Goal: Fluid balance maintained  Description: INTERVENTIONS:  - Monitor labs   - Monitor I/O   - Instruct patient on fluid and nutrition as appropriate  - Assess for  signs & symptoms of volume excess or deficit  Outcome: Progressing

## 2024-10-14 PROBLEM — A53.0 POSITIVE RPR TEST: Status: ACTIVE | Noted: 2024-10-14

## 2024-10-14 LAB
BACTERIA UR CULT: NORMAL
C TRACH DNA SPEC QL NAA+PROBE: NEGATIVE
GP B STREP DNA SPEC QL NAA+PROBE: POSITIVE
N GONORRHOEA DNA SPEC QL NAA+PROBE: NEGATIVE
RPR SER QL: REACTIVE
RPR SER-TITR: ABNORMAL {TITER}

## 2024-10-14 RX ORDER — LABETALOL HYDROCHLORIDE 5 MG/ML
80 INJECTION, SOLUTION INTRAVENOUS ONCE
Status: COMPLETED | OUTPATIENT
Start: 2024-10-14 | End: 2024-10-14

## 2024-10-14 RX ORDER — HYDROXYZINE HYDROCHLORIDE 25 MG/1
25 TABLET, FILM COATED ORAL EVERY 6 HOURS PRN
Status: DISCONTINUED | OUTPATIENT
Start: 2024-10-14 | End: 2024-10-15 | Stop reason: HOSPADM

## 2024-10-14 RX ORDER — NIFEDIPINE 30 MG/1
60 TABLET, EXTENDED RELEASE ORAL DAILY
Status: DISCONTINUED | OUTPATIENT
Start: 2024-10-14 | End: 2024-10-15

## 2024-10-14 RX ADMIN — NIFEDIPINE 60 MG: 30 TABLET, FILM COATED, EXTENDED RELEASE ORAL at 08:41

## 2024-10-14 RX ADMIN — LABETALOL HYDROCHLORIDE 80 MG: 5 INJECTION, SOLUTION INTRAVENOUS at 01:52

## 2024-10-14 RX ADMIN — HYDROXYZINE HYDROCHLORIDE 25 MG: 25 TABLET ORAL at 01:19

## 2024-10-14 RX ADMIN — HYDROXYZINE HYDROCHLORIDE 25 MG: 25 TABLET ORAL at 13:21

## 2024-10-14 RX ADMIN — HYDROXYZINE HYDROCHLORIDE 25 MG: 25 TABLET ORAL at 07:10

## 2024-10-14 RX ADMIN — ACETAMINOPHEN 650 MG: 325 TABLET ORAL at 01:22

## 2024-10-14 RX ADMIN — HYDROXYZINE HYDROCHLORIDE 25 MG: 25 TABLET ORAL at 20:36

## 2024-10-14 NOTE — PROGRESS NOTES
"Progress Note - OB/GYN  Hermelinda Smart 34 y.o. female MRN: [id number]  Unit/Bed#: -01 Encounter: [id number]    Assessment and Plan   Hermelinda Smart is a patient of: [facility]. She is PPD2 s/p spontaneous vaginal delivery. Recovering well and stable.    *  (spontaneous vaginal delivery)  Assessment & Plan  S/p home delivery with delivery of placenta in hospital; patient did not know she was pregnant. Patient progressing toward postpartum milestones  - Continue routine postpartum care  - Pain management with oral analgesics  - Encourage familial bonding  - Contraception: undecided      Positive RPR test  Assessment & Plan  - f/u FTA Ab    No prenatal care in current pregnancy  Assessment & Plan  S/p case management consult    Severe preeclampsia  Assessment & Plan  CBC/CMP wnl  Systolic (12hrs), Av , Min:90 , Max:213   Diastolic (12hrs), Av, Min:50, Max:118  - s/p magnesium sulfate x24hrs postpartum  - current med regimen: Procardia 60mg qd  - continue to monitor BP  - continue to monitor for preeclampsia symptoms        Disposition   - Anticipate discharge home on PPD2-3    Subjective/Objective   Chief Complaint: PPD2 s/p spontaneous vaginal delivery  Subjective:    Hermelinda Smart has no current complaints. Pain is well controlled. She is currently voiding. She is ambulating. She is tolerating PO and denies nausea or vomitting. She denies chest pain, shortness of breath, lightheadedness, headaches, visual disturbance, or RUQ pain. Lochia is normal.     Vitals:   /69 (BP Location: Right arm)   Pulse 78   Temp 98.8 °F (37.1 °C) (Oral)   Resp 16   Ht 5' 2\" (1.575 m)   Wt 95.7 kg (211 lb)   LMP  (Approximate)   SpO2 100%   Breastfeeding No   BMI 38.59 kg/m²       Intake/Output Summary (Last 24 hours) at 10/14/2024 0624  Last data filed at 10/13/2024 1615  Gross per 24 hour   Intake 1634.16 ml   Output 650 ml   Net 984.16 ml     Invasive Devices       Peripheral " Intravenous Line  Duration             Peripheral IV 10/12/24 Left Antecubital 1 day    Peripheral IV 10/13/24 Left;Lower;Ventral (anterior) Arm <1 day                  Physical Exam:   GEN: well-appearing, alert and oriented x3  CARDIO: regular rate  RESP: nonlabored respirations on room air  ABDOMEN: soft, nontender, nodistended, fundus firm below the umbilicus  LOWER EXTREMITIES: nontender, no erythema, trace edema bilaterally    Labs:   Hemoglobin   Date Value Ref Range Status   10/13/2024 10.6 (L) 11.5 - 15.4 g/dL Final   10/13/2024 10.8 (L) 11.5 - 15.4 g/dL Final     WBC   Date Value Ref Range Status   10/13/2024 9.03 4.31 - 10.16 Thousand/uL Final   10/13/2024 11.08 (H) 4.31 - 10.16 Thousand/uL Final     Platelets   Date Value Ref Range Status   10/13/2024 359 149 - 390 Thousands/uL Final   10/13/2024 346 149 - 390 Thousands/uL Final     Creatinine   Date Value Ref Range Status   10/13/2024 0.55 (L) 0.60 - 1.30 mg/dL Final     Comment:     Standardized to IDMS reference method   10/13/2024 0.49 (L) 0.60 - 1.30 mg/dL Final     Comment:     Standardized to IDMS reference method     AST   Date Value Ref Range Status   10/13/2024 16 13 - 39 U/L Final   10/13/2024 17 13 - 39 U/L Final     ALT   Date Value Ref Range Status   10/13/2024 12 7 - 52 U/L Final     Comment:     Specimen collection should occur prior to Sulfasalazine administration due to the potential for falsely depressed results.    10/13/2024 9 7 - 52 U/L Final     Comment:     Specimen collection should occur prior to Sulfasalazine administration due to the potential for falsely depressed results.           Héctor Villa MD  OBGYN PGY-1  10/14/24  6:24 AM

## 2024-10-14 NOTE — PLAN OF CARE
Problem: POSTPARTUM  Goal: Experiences normal postpartum course  Description: INTERVENTIONS:  - Monitor maternal vital signs  - Assess uterine involution and lochia  Outcome: Progressing  Goal: Appropriate maternal -  bonding  Description: INTERVENTIONS:  - Identify family support  - Assess for appropriate maternal/infant bonding   -Encourage maternal/infant bonding opportunities  - Referral to  or  as needed  Outcome: Progressing  Goal: Establishment of infant feeding pattern  Description: INTERVENTIONS:  - Assess breast/bottle feeding  - Refer to lactation as needed  Outcome: Progressing  Goal: Incision(s), wounds(s) or drain site(s) healing without S/S of infection  Description: INTERVENTIONS  - Assess and document dressing, incision, wound bed, drain sites and surrounding tissue  - Provide patient and family education  - Perform skin care/dressing changes every   Outcome: Progressing     Problem: PAIN - ADULT  Goal: Verbalizes/displays adequate comfort level or baseline comfort level  Description: Interventions:  - Encourage patient to monitor pain and request assistance  - Assess pain using appropriate pain scale  - Administer analgesics based on type and severity of pain and evaluate response  - Implement non-pharmacological measures as appropriate and evaluate response  - Consider cultural and social influences on pain and pain management  - Notify physician/advanced practitioner if interventions unsuccessful or patient reports new pain  Outcome: Progressing     Problem: INFECTION - ADULT  Goal: Absence or prevention of progression during hospitalization  Description: INTERVENTIONS:  - Assess and monitor for signs and symptoms of infection  - Monitor lab/diagnostic results  - Monitor all insertion sites, i.e. indwelling lines, tubes, and drains  - Monitor endotracheal if appropriate and nasal secretions for changes in amount and color  - [place] appropriate cooling/warming  therapies per order  - Administer medications as ordered  - Instruct and encourage patient and family to use good hand hygiene technique  - Identify and instruct in appropriate isolation precautions for identified infection/condition  Outcome: Progressing  Goal: Absence of fever/infection during neutropenic period  Description: INTERVENTIONS:  - Monitor WBC    Outcome: Progressing     Problem: SAFETY ADULT  Goal: Patient will remain free of falls  Description: INTERVENTIONS:  - Educate patient/family on patient safety including physical limitations  - Instruct patient to call for assistance with activity   - Consult OT/PT to assist with strengthening/mobility   - Keep Call bell within reach  - Keep bed low and locked with side rails adjusted as appropriate  - Keep care items and personal belongings within reach  - Initiate and maintain comfort rounds  - Make Fall Risk Sign visible to staff  - Offer Toileting every  Hours, in advance of need  - Initiate/Maintain alarm  - Obtain necessary fall risk management equipment:   - Apply yellow socks and bracelet for high fall risk patients  - Consider moving patient to room near nurses station  Outcome: Progressing  Goal: Maintain or return to baseline ADL function  Description: INTERVENTIONS:  -  Assess patient's ability to carry out ADLs; assess patient's baseline for ADL function and identify physical deficits which impact ability to perform ADLs (bathing, care of mouth/teeth, toileting, grooming, dressing, etc.)  - Assess/evaluate cause of self-care deficits   - Assess range of motion  - Assess patient's mobility; develop plan if impaired  - Assess patient's need for assistive devices and provide as appropriate  - Encourage maximum independence but intervene and supervise when necessary  - Involve family in performance of ADLs  - Assess for home care needs following discharge   - Consider OT consult to assist with ADL evaluation and planning for discharge  - Provide  patient education as appropriate  Outcome: Progressing  Goal: Maintains/Returns to pre admission functional level  Description: INTERVENTIONS:  - Perform AM-PAC 6 Click Basic Mobility/ Daily Activity assessment daily.  - Set and communicate daily mobility goal to care team and patient/family/caregiver.   - Collaborate with rehabilitation services on mobility goals if consulted  - Perform Range of Motion times a day  - Reposition patient every  hours.  - Dangle patient  times a day  - Stand patient  times a day  - Ambulate patient  times a day  - Out of bed to chair  times a day   - Out of bed for meals  times a day  - Out of bed for toileting  - Record patient progress and toleration of activity level   Outcome: Progressing     Problem: Knowledge Deficit  Goal: Patient/family/caregiver demonstrates understanding of disease process, treatment plan, medications, and discharge instructions  Description: Complete learning assessment and assess knowledge base.  Interventions:  - Provide teaching at level of understanding  - Provide teaching via preferred learning methods  Outcome: Progressing     Problem: DISCHARGE PLANNING  Goal: Discharge to home or other facility with appropriate resources  Description: INTERVENTIONS:  - Identify barriers to discharge w/patient and caregiver  - Arrange for needed discharge resources and transportation as appropriate  - Identify discharge learning needs (meds, wound care, etc.)  - Arrange for interpretive services to assist at discharge as needed  - Refer to Case Management Department for coordinating discharge planning if the patient needs post-hospital services based on physician/advanced practitioner order or complex needs related to functional status, cognitive ability, or social support system  Outcome: Progressing     Problem: CARDIOVASCULAR - ADULT  Goal: Maintains optimal cardiac output and hemodynamic stability  Description: INTERVENTIONS:  - Monitor I/O, vital signs and  rhythm  - Monitor for S/S and trends of decreased cardiac output  - Administer and titrate ordered vasoactive medications to optimize hemodynamic stability  - Assess quality of pulses, skin color and temperature  - Assess for signs of decreased coronary artery perfusion  - Instruct patient to report change in severity of symptoms  Outcome: Progressing     Problem: METABOLIC, FLUID AND ELECTROLYTES - ADULT  Goal: Fluid balance maintained  Description: INTERVENTIONS:  - Monitor labs   - Monitor I/O and WT  - Instruct patient on fluid and nutrition as appropriate  - Assess for signs & symptoms of volume excess or deficit  Outcome: Progressing

## 2024-10-14 NOTE — UTILIZATION REVIEW
"NOTIFICATION OF INPATIENT ADMISSION   MATERNITY/DELIVERY AUTHORIZATION REQUEST   SERVICING FACILITY:   [facility]  [facility] Child [facility] - L&D, [place], NICU  [address] [place] [address]  Tax ID: [id number]  NPI: [id number]   ATTENDING PROVIDER:  Attending Name and NPI#: Mar Farrell Md [[id number]]  Address: [address] [place] [address]  Phone: [phone]   ADMISSION INFORMATION:  Place of Service: Inpatient [facility]  Place of Service Code: 21  Inpatient Admission Date/Time: 10/12/24  5:01 PM  Discharge Date/Time: No discharge date for patient encounter.  Admitting Diagnosis Code/Description:   (spontaneous vaginal delivery) [O80]     Mother: Hermelinda Smart 1990 Estimated Date of Delivery: 24  Delivering clinician:     OB History          3    Para   3    Term   3            AB        Living   3         SAB        IAB        Ectopic        Multiple        Live Births   3                Name & MRN:   Information for the patient's :  Damaris Smart Boy (Hermelinda) [[id number]]    Delivery Information:  Sex: male  Delivered 10/12/2024 4:00 PM by ; Gestational Age: 37w0d    [place] Measurements:  Weight: 5 lb 13.7 oz (2655 g);  Height: 18\"    APGAR 1 minute 5 minutes 10 minutes   Totals:         UTILIZATION REVIEW CONTACT:  Sruthi Irizarry Utilization   Network Utilization Review Department  Phone: [phone]  Fax [phone]  Email: [email or web address]  Contact for approvals/pending authorizations, clinical reviews, and discharge.     PHYSICIAN ADVISORY SERVICES:  Medical Necessity Denial & Sqfm-td-Tdlx Review  Phone: [phone]  Fax: [phone]  Email: [email or web address]     DISCHARGE SUPPORT TEAM:  For Patients Discharge Needs & Updates  Phone: [phone] opt. 2 Fax: [phone]  Email: [email or web address]      "

## 2024-10-14 NOTE — ASSESSMENT & PLAN NOTE
S/p home delivery with delivery of placenta in hospital; patient did not know she was pregnant. Patient progressing toward postpartum milestones  - Continue routine postpartum care  - Pain management with oral analgesics  - Encourage familial bonding  - Contraception: undecided

## 2024-10-14 NOTE — QUICK NOTE
Present at the bedside to discuss Hermelinda's blood pressures. Called by nursing due to severe range blood pressures that they were just notified of, which are listed below:     169/100, 184/100, 213/114    I was notified of these blood pressures. Patient was acutely treated with 20mg of IV labetalol. Discussed with patient regarding starting 30mg of Procardia XL PO. She denies vision changes, upper abdominal pain, and significant edema. She does endorse a mild headache and feeling flushed since her 24hr of magnesium was discontinued.     Patient is agreeable to plan. She is also requesting something for anxiety. Will rx atarax.    Will continue to treat SRBP if needed. Plan discussed with Dr. Jimenez.     Mulu Crawford MD  OBGYN PGY-4  10/13/24 9:27 PM

## 2024-10-14 NOTE — PLAN OF CARE
Problem: POSTPARTUM  Goal: Experiences normal postpartum course  Description: INTERVENTIONS:  - Monitor maternal vital signs  - Assess uterine involution and lochia  Outcome: Progressing  Goal: Appropriate maternal -  bonding  Description: INTERVENTIONS:  - Identify family support  - Assess for appropriate maternal/infant bonding   -Encourage maternal/infant bonding opportunities  - Referral to  or  as needed  Outcome: Progressing  Goal: Establishment of infant feeding pattern  Description: INTERVENTIONS:  - Assess breast/bottle feeding  - Refer to lactation as needed  Outcome: Progressing  Goal: Incision(s), wounds(s) or drain site(s) healing without S/S of infection  Description: INTERVENTIONS  - Assess and document dressing, incision, wound bed, drain sites and surrounding tissue  - Provide patient and family education  Outcome: Progressing     Problem: PAIN - ADULT  Goal: Verbalizes/displays adequate comfort level or baseline comfort level  Description: Interventions:  - Encourage patient to monitor pain and request assistance  - Assess pain using appropriate pain scale  - Administer analgesics based on type and severity of pain and evaluate response  - Implement non-pharmacological measures as appropriate and evaluate response  - Consider cultural and social influences on pain and pain management  - Notify physician/advanced practitioner if interventions unsuccessful or patient reports new pain  Outcome: Progressing     Problem: INFECTION - ADULT  Goal: Absence or prevention of progression during hospitalization  Description: INTERVENTIONS:  - Assess and monitor for signs and symptoms of infection  - Monitor lab/diagnostic results  - Monitor all insertion sites, i.e. indwelling lines, tubes, and drains  - Monitor endotracheal if appropriate and nasal secretions for changes in amount and color  - [place] appropriate cooling/warming therapies per order  - Administer medications  as ordered  - Instruct and encourage patient and family to use good hand hygiene technique  - Identify and instruct in appropriate isolation precautions for identified infection/condition  Outcome: Progressing  Goal: Absence of fever/infection during neutropenic period  Description: INTERVENTIONS:  - Monitor WBC    Outcome: Progressing     Problem: SAFETY ADULT  Goal: Patient will remain free of falls  Description: INTERVENTIONS:  - Educate patient/family on patient safety including physical limitations  - Instruct patient to call for assistance with activity   - Consult OT/PT to assist with strengthening/mobility   - Keep Call bell within reach  - Keep bed low and locked with side rails adjusted as appropriate  - Keep care items and personal belongings within reach  - Initiate and maintain comfort rounds  - Make Fall Risk Sign visible to staff  - Apply yellow socks and bracelet for high fall risk patients  - Consider moving patient to room near nurses station  Outcome: Progressing  Goal: Maintain or return to baseline ADL function  Description: INTERVENTIONS:  -  Assess patient's ability to carry out ADLs; assess patient's baseline for ADL function and identify physical deficits which impact ability to perform ADLs (bathing, care of mouth/teeth, toileting, grooming, dressing, etc.)  - Assess/evaluate cause of self-care deficits   - Assess range of motion  - Assess patient's mobility; develop plan if impaired  - Assess patient's need for assistive devices and provide as appropriate  - Encourage maximum independence but intervene and supervise when necessary  - Involve family in performance of ADLs  - Assess for home care needs following discharge   - Consider OT consult to assist with ADL evaluation and planning for discharge  - Provide patient education as appropriate  Outcome: Progressing  Goal: Maintains/Returns to pre admission functional level  Description: INTERVENTIONS:  - Perform AM-PAC 6 Click Basic  Mobility/ Daily Activity assessment daily.  - Set and communicate daily mobility goal to care team and patient/family/caregiver.   - Collaborate with rehabilitation services on mobility goals if consulted  - Out of bed for toileting  - Record patient progress and toleration of activity level   Outcome: Progressing     Problem: Knowledge Deficit  Goal: Patient/family/caregiver demonstrates understanding of disease process, treatment plan, medications, and discharge instructions  Description: Complete learning assessment and assess knowledge base.  Interventions:  - Provide teaching at level of understanding  - Provide teaching via preferred learning methods  Outcome: Progressing     Problem: DISCHARGE PLANNING  Goal: Discharge to home or other facility with appropriate resources  Description: INTERVENTIONS:  - Identify barriers to discharge w/patient and caregiver  - Arrange for needed discharge resources and transportation as appropriate  - Identify discharge learning needs (meds, wound care, etc.)  - Arrange for interpretive services to assist at discharge as needed  - Refer to Case Management Department for coordinating discharge planning if the patient needs post-hospital services based on physician/advanced practitioner order or complex needs related to functional status, cognitive ability, or social support system  Outcome: Progressing     Problem: CARDIOVASCULAR - ADULT  Goal: Maintains optimal cardiac output and hemodynamic stability  Description: INTERVENTIONS:  - Monitor I/O, vital signs and rhythm  - Monitor for S/S and trends of decreased cardiac output  - Administer and titrate ordered vasoactive medications to optimize hemodynamic stability  - Assess quality of pulses, skin color and temperature  - Assess for signs of decreased coronary artery perfusion  - Instruct patient to report change in severity of symptoms  Outcome: Progressing     Problem: METABOLIC, FLUID AND ELECTROLYTES - ADULT  Goal: Fluid  balance maintained  Description: INTERVENTIONS:  - Monitor labs   - Monitor I/O and WT  - Instruct patient on fluid and nutrition as appropriate  - Assess for signs & symptoms of volume excess or deficit  Outcome: Progressing

## 2024-10-14 NOTE — ASSESSMENT & PLAN NOTE
CBC/CMP wnl  Systolic (12hrs), Av , Min:90 , Max:213   Diastolic (12hrs), Av, Min:50, Max:118  - s/p magnesium sulfate x24hrs postpartum  - current med regimen: Procardia 60mg qd  - continue to monitor BP  - continue to monitor for preeclampsia symptoms

## 2024-10-15 VITALS
RESPIRATION RATE: 18 BRPM | BODY MASS INDEX: 38.83 KG/M2 | WEIGHT: 211 LBS | DIASTOLIC BLOOD PRESSURE: 80 MMHG | TEMPERATURE: 99 F | OXYGEN SATURATION: 100 % | SYSTOLIC BLOOD PRESSURE: 154 MMHG | HEART RATE: 125 BPM | HEIGHT: 62 IN

## 2024-10-15 PROBLEM — Z87.59 HISTORY OF GESTATIONAL HYPERTENSION: Status: RESOLVED | Noted: 2024-10-12 | Resolved: 2024-10-15

## 2024-10-15 PROBLEM — F41.9 ANXIETY: Status: ACTIVE | Noted: 2024-10-15

## 2024-10-15 PROBLEM — A53.9 SYPHILIS: Status: ACTIVE | Noted: 2024-10-15

## 2024-10-15 LAB — SL AMB TREPONEMA PALLIDUM ANTIBODIES: REACTIVE

## 2024-10-15 PROCEDURE — 99232 SBSQ HOSP IP/OBS MODERATE 35: CPT | Performed by: OBSTETRICS & GYNECOLOGY

## 2024-10-15 PROCEDURE — NC001 PR NO CHARGE: Performed by: OBSTETRICS & GYNECOLOGY

## 2024-10-15 RX ORDER — ACETAMINOPHEN 325 MG/1
650 TABLET ORAL EVERY 4 HOURS PRN
Refills: 0 | Status: CANCELLED | OUTPATIENT
Start: 2024-10-15

## 2024-10-15 RX ORDER — NIFEDIPINE 30 MG/1
90 TABLET, EXTENDED RELEASE ORAL DAILY
Qty: 270 TABLET | Refills: 0 | Status: SHIPPED | OUTPATIENT
Start: 2024-10-16 | End: 2025-01-14

## 2024-10-15 RX ORDER — FUROSEMIDE 20 MG/1
20 TABLET ORAL ONCE
Status: COMPLETED | OUTPATIENT
Start: 2024-10-15 | End: 2024-10-15

## 2024-10-15 RX ORDER — ACETAMINOPHEN 325 MG/1
650 TABLET ORAL EVERY 4 HOURS PRN
Status: CANCELLED
Start: 2024-10-15

## 2024-10-15 RX ORDER — NIFEDIPINE 30 MG/1
60 TABLET, EXTENDED RELEASE ORAL DAILY
Qty: 60 TABLET | Refills: 0 | Status: CANCELLED | OUTPATIENT
Start: 2024-10-15

## 2024-10-15 RX ORDER — LABETALOL 200 MG/1
200 TABLET, FILM COATED ORAL 2 TIMES DAILY
Qty: 180 TABLET | Refills: 0 | Status: SHIPPED | OUTPATIENT
Start: 2024-10-15 | End: 2025-01-13

## 2024-10-15 RX ORDER — ACETAMINOPHEN 325 MG/1
650 TABLET ORAL EVERY 6 HOURS SCHEDULED
Start: 2024-10-16

## 2024-10-15 RX ORDER — ADHESIVE BANDAGE 3/4"
BANDAGE TOPICAL DAILY
Qty: 1 EACH | Refills: 0 | Status: SHIPPED | OUTPATIENT
Start: 2024-10-15

## 2024-10-15 RX ORDER — NIFEDIPINE 30 MG/1
90 TABLET, EXTENDED RELEASE ORAL DAILY
Status: DISCONTINUED | OUTPATIENT
Start: 2024-10-15 | End: 2024-10-15 | Stop reason: HOSPADM

## 2024-10-15 RX ORDER — HYDROXYZINE HYDROCHLORIDE 25 MG/1
25 TABLET, FILM COATED ORAL EVERY 6 HOURS PRN
Qty: 30 TABLET | Refills: 0 | Status: SHIPPED | OUTPATIENT
Start: 2024-10-15

## 2024-10-15 RX ORDER — SERTRALINE HYDROCHLORIDE 25 MG/1
25 TABLET, FILM COATED ORAL DAILY
Qty: 90 TABLET | Refills: 0 | Status: SHIPPED | OUTPATIENT
Start: 2024-10-15 | End: 2025-01-13

## 2024-10-15 RX ORDER — ACETAMINOPHEN 325 MG/1
650 TABLET ORAL EVERY 6 HOURS SCHEDULED
Status: DISCONTINUED | OUTPATIENT
Start: 2024-10-15 | End: 2024-10-15 | Stop reason: HOSPADM

## 2024-10-15 RX ORDER — LABETALOL 200 MG/1
200 TABLET, FILM COATED ORAL DAILY
Status: DISCONTINUED | OUTPATIENT
Start: 2024-10-15 | End: 2024-10-15 | Stop reason: HOSPADM

## 2024-10-15 RX ORDER — IBUPROFEN 600 MG/1
600 TABLET, FILM COATED ORAL EVERY 6 HOURS
Start: 2024-10-15

## 2024-10-15 RX ORDER — HYDROXYZINE HYDROCHLORIDE 25 MG/1
25 TABLET, FILM COATED ORAL ONCE
Status: COMPLETED | OUTPATIENT
Start: 2024-10-15 | End: 2024-10-15

## 2024-10-15 RX ADMIN — HYDROXYZINE HYDROCHLORIDE 25 MG: 25 TABLET ORAL at 03:30

## 2024-10-15 RX ADMIN — PENICILLIN G BENZATHINE 2.4 MILLION UNITS: 1200000 INJECTION, SUSPENSION INTRAMUSCULAR at 16:03

## 2024-10-15 RX ADMIN — HYDROXYZINE HYDROCHLORIDE 25 MG: 25 TABLET, FILM COATED ORAL at 13:07

## 2024-10-15 RX ADMIN — FUROSEMIDE 20 MG: 20 TABLET ORAL at 13:07

## 2024-10-15 RX ADMIN — HYDROXYZINE HYDROCHLORIDE 25 MG: 25 TABLET ORAL at 09:22

## 2024-10-15 RX ADMIN — LABETALOL HYDROCHLORIDE 200 MG: 200 TABLET, FILM COATED ORAL at 12:44

## 2024-10-15 RX ADMIN — NIFEDIPINE 90 MG: 30 TABLET, FILM COATED, EXTENDED RELEASE ORAL at 09:21

## 2024-10-15 NOTE — ASSESSMENT & PLAN NOTE
S/p home delivery with delivery of placenta in hospital; patient did not know she was pregnant. Patient progressing toward postpartum milestones  - Continue routine postpartum care  - Pain management with oral analgesics  - Encourage familial bonding  - Contraception: undecided    Discussed follow up with [facility] practice in [place]

## 2024-10-15 NOTE — ASSESSMENT & PLAN NOTE
S/p home delivery with delivery of placenta in hospital; patient did not know she was pregnant. Patient progressing toward postpartum milestones  - Continue routine postpartum care  - Pain management with oral analgesics  - Encourage familial bonding  - Contraception: undecided    Discussed follow up with [facility] practice in [place]

## 2024-10-15 NOTE — ASSESSMENT & PLAN NOTE
CBC/CMP wnl  Systolic (12hrs), Av , Min:135 , Max:190   Diastolic (12hrs), Av, Min:77, Max:97  - s/p magnesium sulfate x24hrs postpartum  - current med regimen: Procardia 60mg qd  - last SRBP early AM on 10/14  - continue to monitor BP and for preeclampsia symptoms    If discharged, will order BP cuff and need BP check in office this week

## 2024-10-15 NOTE — UTILIZATION REVIEW
Continued Stay Review    Date: 10-15-24                          Current Patient Class: inpatient  Current Level of Care: medical     HPI:34 y.o. female initially admitted on 10-12-24     Assessment/Plan: PPD # 3   175 after receiving Procardia XL 90mg this morning, continues to deny all symptoms of preeclampsia. Will start labetalol 200mg at least daily and continue to titrate BP medication    CBC/CMP wnl  Systolic (12hrs), Av , Min:124 , Max:156   Diastolic (12hrs), Av, Min:63, Max:79  - s/p magnesium sulfate x24hrs postpartum  - current med regimen: Procardia 60mg qd  ---> increased to 90 mg     Medications:   Scheduled Medications:  acetaminophen, 650 mg, Oral, Q6H BROOKE  docusate sodium, 100 mg, Oral, BID  ibuprofen, 600 mg, Oral, Q6H  labetalol, 200 mg, Oral, Daily  NIFEdipine, 90 mg, Oral, Daily  Penicillin G Benzathine, 2.4 Million Units, Intramuscular, Weekly      Continuous IV Infusions:     PRN Meds:  benzocaine-menthol-lanolin-aloe, 1 Application, Topical, Q6H PRN  calcium carbonate, 1,000 mg, Oral, Daily PRN  calcium gluconate, 1 g, Intravenous, Once PRN  diphenhydrAMINE, 25 mg, Oral, Q6H PRN  hydrocortisone, 1 Application, Topical, Daily PRN  hydrOXYzine HCL, 25 mg, Oral, Q6H PRN  ondansetron, 4 mg, Intravenous, Q8H PRN  simethicone, 80 mg, Oral, 4x Daily PRN  witch hazel-glycerin, 1 Pad, Topical, Q4H PRN      Discharge Plan: home when medically stable      Vital Signs (last 3 days)       Date/Time Temp Pulse Resp BP MAP (mmHg) SpO2 O2 Device Cardiac (WDL) Patient Position - Orthostatic VS Pain    10/15/24 1300 -- -- -- 190/90 -- -- -- -- Sitting --    10/15/24 1230 98.5 °F (36.9 °C) 119 18 175/93 -- 98 % None (Room air) -- -- No Pain    10/15/24 1022 -- 109 -- 154/77 -- -- -- -- -- --    10/15/24 0925 -- -- -- 166/78 -- -- -- -- -- --    10/15/24 0920 -- -- -- -- -- -- None (Room air) WDL -- No Pain    10/15/24 0914 -- -- -- -- -- -- -- -- -- No Pain    10/15/24 0902 98.5 °F (36.9 °C) 114 18  178/97 -- 98 % -- -- -- No Pain    10/15/24 0812 -- -- -- -- -- -- -- -- -- No Pain    10/15/24 0356 98 °F (36.7 °C) 97 18 156/77 -- 100 % -- -- -- No Pain    10/15/24 0330 -- -- -- -- -- -- -- -- -- No Pain    10/14/24 2358 98.2 °F (36.8 °C) 95 18 124/63 -- 100 % -- -- -- No Pain    10/14/24 2258 -- -- -- -- -- -- -- -- -- No Pain    10/14/24 2200 -- -- -- -- -- -- None (Room air) WDL -- --    10/14/24 1954 98.7 °F (37.1 °C) 101 18 137/79 -- -- None (Room air) -- Sitting No Pain    10/14/24 1635 98.8 °F (37.1 °C) 104 18 136/83 -- -- -- -- Sitting No Pain    10/14/24 1155 98.1 °F (36.7 °C) 101 20 143/81 -- 98 % -- -- -- No Pain    10/14/24 0930 -- 102 18 129/78 -- -- -- -- Sitting --    10/14/24 0759 -- 99 20 130/68 -- -- -- -- -- --    10/14/24 0659 98.5 °F (36.9 °C) 94 20 112/70 -- -- -- -- -- No Pain    10/14/24 0600 -- 78 -- 116/69 -- -- -- -- Lying --    10/14/24 0500 -- 84 -- 121/75 -- -- -- -- Lying --    10/14/24 0430 -- 81 -- 103/63 -- -- -- -- Lying --    10/14/24 0400 98.8 °F (37.1 °C) 80 16 112/64 -- 100 % -- -- Lying No Pain    10/14/24 0345 -- 80 -- 96/53 -- -- -- -- Lying --    10/14/24 0330 -- 90 -- 98/55 -- -- -- -- Lying --    10/14/24 0315 -- 81 -- 97/50 -- -- -- -- Lying --    10/14/24 0300 -- 85 -- 104/59 -- -- -- -- Lying --    10/14/24 0250 -- 85 -- 99/53 -- -- -- -- Lying --    10/14/24 0240 -- 87 -- 90/52 -- -- -- -- Lying --    10/14/24 0230 -- 88 -- 96/51 -- -- -- -- Lying --    10/14/24 0220 -- 88 -- 101/57 -- -- -- -- Lying --    10/14/24 0210 -- 88 -- 127/71 -- -- -- -- Lying --    10/14/24 0200 -- 92 -- 131/66 -- -- -- -- Lying --    10/14/24 0152 -- 91 -- 169/102 -- -- -- -- Lying --    10/14/24 0122 -- -- -- -- -- -- -- -- -- 2    10/14/24 0120 -- 88 -- 175/96 -- -- -- -- Lying --    10/14/24 0104 -- 95 -- 164/89 -- -- -- -- Lying --    10/14/24 0037 -- 92 -- 140/82 -- -- -- -- Lying --    10/14/24 0007 -- 87 -- 135/81 -- -- -- -- Lying --    10/13/24 2353 98.7 °F (37.1 °C) 96 17  144/91 -- 99 % -- -- Lying No Pain    10/13/24 2338 -- 92 -- 145/98 -- -- -- -- Lying --    10/13/24 2323 -- 88 -- 152/104 -- -- -- -- Lying --    10/13/24 2307 -- 90 -- 143/92 -- -- -- -- Lying --    10/13/24 2257 -- 89 -- 144/98 -- -- -- -- Lying --    10/13/24 2247 -- 91 -- 143/102 -- -- -- -- Lying --    10/13/24 2237 -- 90 -- 132/96 -- -- -- -- Lying --    10/13/24 2227 -- 89 -- 137/99 -- -- -- -- Lying --    10/13/24 2217 -- 88 -- 150/92 -- -- -- -- Lying --    10/13/24 2207 -- 92 -- 169/100 -- -- -- -- Lying --    10/13/24 2153 -- 87 -- 165/100 -- -- -- -- Lying --    10/13/24 2144 -- 88 -- 157/98 -- -- -- -- Lying --    10/13/24 2134 -- 91 -- 170/111 -- -- -- -- Lying --    10/13/24 2124 -- 91 -- 165/118 -- -- -- -- Lying --    OBSERV: RN attempting to insert second IV at this time; pt reports feeling anxious at 10/13/24 2124    10/13/24 2114 -- 93 -- 156/93 -- -- -- -- Lying --    10/13/24 2113 -- -- -- -- -- -- -- -- -- 1    10/13/24 2103 -- -- -- 166/105 -- -- -- -- Lying --    10/13/24 2053 -- 117 18 202/105 -- 100 % -- -- Sitting --    10/13/24 2030 -- -- -- 213/114 -- -- -- -- -- --    10/13/24 2015 -- -- -- 184/100 -- -- -- WDL -- --    10/13/24 2000 98.6 °F (37 °C) 98 18 169/100 -- 100 % None (Room air) -- Sitting --    10/13/24 1808 97.9 °F (36.6 °C) 94 18 146/83 -- 97 % None (Room air) -- Sitting - Orthostatic VS No Pain    10/13/24 1617 97.9 °F (36.6 °C) 100 18 133/87 -- 100 % None (Room air) -- Sitting No Pain    10/13/24 1400 98 °F (36.7 °C) 99 18 132/86 105 98 % None (Room air) -- Sitting 2    10/13/24 1349 -- -- -- -- -- -- -- -- -- 5    10/13/24 1136 98.1 °F (36.7 °C) 97 18 137/85 106 100 % None (Room air) -- Sitting No Pain    10/13/24 1000 97.5 °F (36.4 °C) 95 18 142/84 -- 100 % None (Room air) -- Sitting No Pain    10/13/24 0805 98.2 °F (36.8 °C) 104 18 148/79 -- 100 % None (Room air) WDL Sitting No Pain    OBSERV: Plan of care for today reviewed w/ pt.  Pt denies HA, visual changes, or  epigastric pain.  Plan to continue Magnesium until this afternoon and monitoring BP and bloodwork discussed. at 10/13/24 0805    10/13/24 0551 97.6 °F (36.4 °C) 92 18 133/73 -- 99 % -- -- -- --    10/13/24 0343 -- 101 18 136/82 -- 99 % -- -- -- --    10/13/24 0340 -- -- -- -- -- -- -- -- -- 3    10/13/24 0246 97.5 °F (36.4 °C) 91 18 141/79 -- 99 % -- -- -- --    10/13/24 0128 -- 83 18 132/93 -- 100 % -- -- -- --    10/13/24 0000 -- -- -- -- -- -- None (Room air) -- -- --    10/12/24 2345 -- -- -- 148/104 -- -- -- -- -- --    10/12/24 2330 -- 86 -- 143/78 -- -- -- -- -- --    10/12/24 2322 -- 86 -- 134/70 -- -- -- -- -- --    10/12/24 2312 -- 91 -- 148/83 -- -- -- -- -- --    10/12/24 2302 -- 101 -- 145/86 -- -- -- -- -- --    10/12/24 2252 -- 96 -- 133/60 -- -- -- -- -- --    10/12/24 2242 -- 96 -- 137/71 -- -- -- -- -- --    10/12/24 2232 -- 93 -- 135/72 -- -- -- -- -- --    10/12/24 2222 -- 96 -- 139/80 -- -- -- -- -- --    10/12/24 2212 -- 98 -- 140/84 -- -- -- -- -- --    10/12/24 2200 -- -- -- -- -- -- None (Room air) WDL -- 2    10/12/24 2052 -- 95 -- 143/72 -- -- -- -- -- --    10/12/24 2043 -- 97 -- 150/71 -- -- -- -- -- --    10/12/24 2032 -- 96 -- 137/77 -- -- -- -- -- --    10/12/24 2022 -- 93 -- 135/79 -- -- -- -- -- --    10/12/24 2012 -- 93 -- 133/78 -- -- -- -- -- --    10/12/24 2002 -- 96 -- 131/82 -- -- None (Room air) -- -- --    10/12/24 1952 -- 50 -- 164/88 -- -- -- -- -- --    10/12/24 1950 -- -- -- -- -- -- -- -- -- 5    10/12/24 1942 -- 103 -- 165/89 -- -- -- -- -- --    10/12/24 1932 -- 100 -- 166/89 -- -- -- -- -- --    10/12/24 1929 -- 103 -- 177/89 -- -- -- -- -- --    10/12/24 1922 -- 103 -- 186/111 -- -- -- -- -- --    10/12/24 1912 -- 97 -- 151/88 -- -- -- -- -- --    10/12/24 1902 -- 104 -- 156/94 -- -- -- -- -- --    10/12/24 1830 -- 94 -- 162/92 -- -- -- -- -- --    10/12/24 1818 -- -- -- 158/93 -- -- -- -- -- --    10/12/24 1815 -- -- -- 158/93 -- -- -- -- -- --    10/12/24 1752 --  -- -- 149/89 -- -- -- -- -- --    10/12/24 1745 -- -- -- 149/89 -- -- -- -- -- --    10/12/24 1730 -- 88 -- 149/86 -- -- -- -- -- --    10/12/24 1722 98.2 °F (36.8 °C) -- 18 -- -- -- -- -- Lying --          Weight (last 2 days)       Date/Time    10/13/24 2124    Comment rows:    OBSERV: RN attempting to insert second IV at this time; pt reports feeling anxious at 10/13/24 2124    10/13/24 0805    Comment rows:    OBSERV: Plan of care for today reviewed w/ pt.  Pt denies HA, visual changes, or epigastric pain.  Plan to continue Magnesium until this afternoon and monitoring BP and bloodwork discussed. at 10/13/24 0805            Pertinent Labs/Diagnostic Results:   Radiology:  No orders to display     Cardiology:  No orders to display     GI:  No orders to display           Results from last 7 days   Lab Units 10/13/24  1212 10/13/24  0638 10/13/24  0051 10/12/24  1754   WBC Thousand/uL 9.03 11.08* 11.94* 13.36*   HEMOGLOBIN g/dL 10.6* 10.8* 10.8* 11.3*   HEMATOCRIT % 30.4* 31.9* 31.6* 32.9*   PLATELETS Thousands/uL 359 346 319 322         Results from last 7 days   Lab Units 10/13/24  1212 10/13/24  0638 10/13/24  0051 10/12/24  1802   SODIUM mmol/L 133* 131* 133* 132*   POTASSIUM mmol/L 4.0 4.0 3.9 3.7   CHLORIDE mmol/L 105 105 104 103   CO2 mmol/L 20* 15* 18* 17*   ANION GAP mmol/L 8 11 11 12   BUN mg/dL 8 6 6 8   CREATININE mg/dL 0.55* 0.49* 0.51* 0.48*   EGFR ml/min/1.73sq m 122 127 125 128   CALCIUM mg/dL 7.7* 7.7* 8.6 8.8   MAGNESIUM mg/dL 4.6* 4.7* 4.0*  --      Results from last 7 days   Lab Units 10/13/24  1212 10/13/24  0638 10/13/24  0051 10/12/24  1802   AST U/L 16 17 16 14   ALT U/L 12 9 11 12   ALK PHOS U/L 127* 129* 147* 154*   TOTAL PROTEIN g/dL 6.6 6.5 6.7 6.8   ALBUMIN g/dL 3.4* 3.2* 3.4* 3.5   TOTAL BILIRUBIN mg/dL 0.58 0.75 0.72 0.59         Results from last 7 days   Lab Units 10/13/24  1212 10/13/24  0638 10/13/24  0051 10/12/24  1802   GLUCOSE RANDOM mg/dL 136 110 129 130                  Results from last 7 days   Lab Units 10/12/24  1802   FERRITIN ng/mL 12             Results from last 7 days   Lab Units 10/12/24  1802   HEP B S AG  Non-reactive   HEP C AB  Non-reactive                     Results from last 7 days   Lab Units 10/12/24  1904   CLARITY UA  Clear   COLOR UA  Light Yellow   SPEC GRAV UA  1.015   PH UA  5.5   GLUCOSE UA mg/dl 70 (7/100%)*   KETONES UA mg/dl 80 (3+)*   BLOOD UA  Small*   PROTEIN UA mg/dl 50 (1+)*   NITRITE UA  Negative   BILIRUBIN UA  Negative   UROBILINOGEN UA (BE) mg/dl <2.0   LEUKOCYTES UA  Negative   WBC UA /hpf 1-2   RBC UA /hpf 2-4*   BACTERIA UA /hpf None Seen   EPITHELIAL CELLS WET PREP /hpf Occasional                                 Results from last 7 days   Lab Units 10/12/24  1905   URINE CULTURE  No Growth <1000 cfu/mL                   Network Utilization Review Department  ATTENTION: Please call with any questions or concerns to [phone] and carefully listen to the prompts so that you are directed to the right person. All voicemails are confidential.   For Discharge needs, contact Care Management DC Support Team at [phone] opt. [phone]  Send all requests for admission clinical reviews, approved or denied determinations and any other requests to dedicated fax number below belonging to the campus where the patient is receiving treatment. List of dedicated fax numbers for the Facilities:  FACILITY NAME UR FAX NUMBER   ADMISSION DENIALS (Administrative/Medical Necessity) [phone]   DISCHARGE SUPPORT TEAM (NETWORK) [phone]   PARENT CHILD HEALTH (Maternity/NICU/Pediatrics) [phone]   [facility] [phone]   [facility] [phone]   [facility] [phone]   [facility] [phone]   [facility] [phone]   [facility] [phone]   [facility] [phone]    [facility] [phone]   [facility] [phone]   [facility] [phone]   [facility] [phone]   [facility] [phone]

## 2024-10-15 NOTE — PROGRESS NOTES
"Progress Note - OB/GYN  Hermelinda Smart 34 y.o. female MRN: [id number]  Unit/Bed#: -01 Encounter: [id number]    Assessment and Plan   Hermelinda Smart is a patient of: [facility], [facility]. She is PPD3 s/p spontaneous vaginal delivery who remained admitted for BP medication titration. Recovering well and stable.    Positive RPR test  Assessment & Plan  - f/u FTA Ab    No prenatal care in current pregnancy  Assessment & Plan  S/p case management consult    Severe preeclampsia  Assessment & Plan  CBC/CMP wnl  Systolic (12hrs), Av , Min:124 , Max:156   Diastolic (12hrs), Av, Min:63, Max:79  - s/p magnesium sulfate x24hrs postpartum  - current med regimen: Procardia 60mg qd  - last SRBP early AM on 10/14  - continue to monitor BP and for preeclampsia symptoms    If discharged, will order BP cuff and need BP check in office this week    *  (spontaneous vaginal delivery)  Assessment & Plan  S/p home delivery with delivery of placenta in hospital; patient did not know she was pregnant. Patient progressing toward postpartum milestones  - Continue routine postpartum care  - Pain management with oral analgesics  - Encourage familial bonding  - Contraception: undecided    Discussed follow up with [facility] practice in [place]      Disposition   - Anticipate discharge home on PPD3 if BP controlled      Subjective:    Hemrelinda Smart has no current complaints. Pain is well controlled. She is currently voiding. She is ambulating. She is tolerating PO and denies nausea or vomitting. She denies chest pain, shortness of breath, lightheadedness, headaches, visual disturbance, or RUQ pain. Lochia is normal.     Vitals:   /77 Comment: md aware  Pulse 97   Temp 98 °F (36.7 °C) (Oral)   Resp 18   Ht 5' 2\" (1.575 m)   Wt 95.7 kg (211 lb)   LMP  (Approximate)   SpO2 100%   Breastfeeding No   BMI 38.59 kg/m²     No intake or output data in the 24 hours ending 10/15/24 0720    Invasive Devices       " Peripheral Intravenous Line  Duration             Peripheral IV 10/13/24 Left;Lower;Ventral (anterior) Arm 1 day                  Physical Exam:   GEN: well-appearing, alert and oriented x3  CARDIO: regular rate  RESP: nonlabored respirations on room air  ABDOMEN: soft, nontender, nodistended, fundus firm below the umbilicus  LOWER EXTREMITIES: nontender, no erythema, trace edema bilaterally    Labs:   Hemoglobin   Date Value Ref Range Status   10/13/2024 10.6 (L) 11.5 - 15.4 g/dL Final   10/13/2024 10.8 (L) 11.5 - 15.4 g/dL Final     WBC   Date Value Ref Range Status   10/13/2024 9.03 4.31 - 10.16 Thousand/uL Final   10/13/2024 11.08 (H) 4.31 - 10.16 Thousand/uL Final     Platelets   Date Value Ref Range Status   10/13/2024 359 149 - 390 Thousands/uL Final   10/13/2024 346 149 - 390 Thousands/uL Final     Creatinine   Date Value Ref Range Status   10/13/2024 0.55 (L) 0.60 - 1.30 mg/dL Final     Comment:     Standardized to IDMS reference method   10/13/2024 0.49 (L) 0.60 - 1.30 mg/dL Final     Comment:     Standardized to IDMS reference method     AST   Date Value Ref Range Status   10/13/2024 16 13 - 39 U/L Final   10/13/2024 17 13 - 39 U/L Final     ALT   Date Value Ref Range Status   10/13/2024 12 7 - 52 U/L Final     Comment:     Specimen collection should occur prior to Sulfasalazine administration due to the potential for falsely depressed results.    10/13/2024 9 7 - 52 U/L Final     Comment:     Specimen collection should occur prior to Sulfasalazine administration due to the potential for falsely depressed results.      Lisa Del Rio MD   OB/Gyn PGY-4  7:20 AM  10/15/24

## 2024-10-15 NOTE — ASSESSMENT & PLAN NOTE
- Positive FTA-Abs  - Patient notified and understands she needs PCN G weekly for 3w.   - First dose given on 10/15

## 2024-10-15 NOTE — PROGRESS NOTES
"Progress Note - OB/GYN   Name: Hermelinda Smart 34 y.o. female I MRN: [id number]  Unit/Bed#: -01 I Date of Admission: 10/12/2024   Date of Service: 10/15/2024 I Hospital Day: 3     Assessment & Plan   (spontaneous vaginal delivery)  S/p home delivery with delivery of placenta in hospital; patient did not know she was pregnant. Patient progressing toward postpartum milestones  - Continue routine postpartum care  - Pain management with oral analgesics  - Encourage familial bonding  - Contraception: undecided    Discussed follow up with [facility] practice in [place]  Severe preeclampsia  CBC/CMP wnl  Systolic (12hrs), Av , Min:135 , Max:190   Diastolic (12hrs), Av, Min:77, Max:97  - s/p magnesium sulfate x24hrs postpartum  - current med regimen: Procardia 60mg qd  - last SRBP early AM on 10/14  - continue to monitor BP and for preeclampsia symptoms    If discharged, will order BP cuff and need BP check in office this week  No prenatal care in current pregnancy  S/p case management consult  Positive RPR test  - Positive FTA-Abs  - Patient notified and understands she needs PCN G weekly for 3w.   - First dose given on 10/15      Present at the bedside due to patient demanding to leave AMA. She states she has a lot of anxiety and \"just needs to leave\". I reviewed that our recommendation to stay is due to her difficulty controlling blood pressures. I discussed the risk of maternal morbidity and mortality, including risk of seizure, stroke, MI, ICU admission and death. I explained that although patient is choosing to leave AMA, she is welcome to represent if she requires medical assistance. The AMA form was signed. Nursing was notified to remove patient's IV prior to discharge.     She requested zoloft 25mg for anxiety and her BP meds of Procardia 90mg XL qD and Labetalol 200mg BID.     Mulu Crawford MD  OBGYN PGY-4  10/16/24 12:56 AM  "

## 2024-10-15 NOTE — PLAN OF CARE
Problem: POSTPARTUM  Goal: Experiences normal postpartum course  Description: INTERVENTIONS:  - Monitor maternal vital signs  - Assess uterine involution and lochia  Outcome: Progressing  Goal: Appropriate maternal -  bonding  Description: INTERVENTIONS:  - Identify family support  - Assess for appropriate maternal/infant bonding   -Encourage maternal/infant bonding opportunities  - Referral to  or  as needed  Outcome: Progressing  Goal: Establishment of infant feeding pattern  Description: INTERVENTIONS:  - Assess breast/bottle feeding  - Refer to lactation as needed  Outcome: Progressing  Goal: Incision(s), wounds(s) or drain site(s) healing without S/S of infection  Description: INTERVENTIONS  - Assess and document dressing, incision, wound bed, drain sites and surrounding tissue  - Provide patient and family education  Outcome: Progressing     Problem: PAIN - ADULT  Goal: Verbalizes/displays adequate comfort level or baseline comfort level  Description: Interventions:  - Encourage patient to monitor pain and request assistance  - Assess pain using appropriate pain scale  - Administer analgesics based on type and severity of pain and evaluate response  - Implement non-pharmacological measures as appropriate and evaluate response  - Consider cultural and social influences on pain and pain management  - Notify physician/advanced practitioner if interventions unsuccessful or patient reports new pain  Outcome: Progressing     Problem: INFECTION - ADULT  Goal: Absence or prevention of progression during hospitalization  Description: INTERVENTIONS:  - Assess and monitor for signs and symptoms of infection  - Monitor lab/diagnostic results  - Monitor all insertion sites, i.e. indwelling lines, tubes, and drains  - Monitor endotracheal if appropriate and nasal secretions for changes in amount and color  - [place] appropriate cooling/warming therapies per order  - Administer medications  as ordered  - Instruct and encourage patient and family to use good hand hygiene technique  - Identify and instruct in appropriate isolation precautions for identified infection/condition  Outcome: Progressing  Goal: Absence of fever/infection during neutropenic period  Description: INTERVENTIONS:  - Monitor WBC    Outcome: Progressing     Problem: SAFETY ADULT  Goal: Patient will remain free of falls  Description: INTERVENTIONS:  - Educate patient/family on patient safety including physical limitations  - Instruct patient to call for assistance with activity   - Consult OT/PT to assist with strengthening/mobility   - Keep Call bell within reach  - Keep bed low and locked with side rails adjusted as appropriate  - Keep care items and personal belongings within reach  - Initiate and maintain comfort rounds  - Make Fall Risk Sign visible to staff  - Apply yellow socks and bracelet for high fall risk patients  - Consider moving patient to room near nurses station  Outcome: Progressing  Goal: Maintain or return to baseline ADL function  Description: INTERVENTIONS:  -  Assess patient's ability to carry out ADLs; assess patient's baseline for ADL function and identify physical deficits which impact ability to perform ADLs (bathing, care of mouth/teeth, toileting, grooming, dressing, etc.)  - Assess/evaluate cause of self-care deficits   - Assess range of motion  - Assess patient's mobility; develop plan if impaired  - Assess patient's need for assistive devices and provide as appropriate  - Encourage maximum independence but intervene and supervise when necessary  - Involve family in performance of ADLs  - Assess for home care needs following discharge   - Consider OT consult to assist with ADL evaluation and planning for discharge  - Provide patient education as appropriate  Outcome: Progressing  Goal: Maintains/Returns to pre admission functional level  Description: INTERVENTIONS:  - Perform AM-PAC 6 Click Basic  Mobility/ Daily Activity assessment daily.  - Set and communicate daily mobility goal to care team and patient/family/caregiver.   - Collaborate with rehabilitation services on mobility goals if consulted  - Out of bed for toileting  - Record patient progress and toleration of activity level   Outcome: Progressing     Problem: Knowledge Deficit  Goal: Patient/family/caregiver demonstrates understanding of disease process, treatment plan, medications, and discharge instructions  Description: Complete learning assessment and assess knowledge base.  Interventions:  - Provide teaching at level of understanding  - Provide teaching via preferred learning methods  Outcome: Progressing     Problem: DISCHARGE PLANNING  Goal: Discharge to home or other facility with appropriate resources  Description: INTERVENTIONS:  - Identify barriers to discharge w/patient and caregiver  - Arrange for needed discharge resources and transportation as appropriate  - Identify discharge learning needs (meds, wound care, etc.)  - Arrange for interpretive services to assist at discharge as needed  - Refer to Case Management Department for coordinating discharge planning if the patient needs post-hospital services based on physician/advanced practitioner order or complex needs related to functional status, cognitive ability, or social support system  Outcome: Progressing     Problem: CARDIOVASCULAR - ADULT  Goal: Maintains optimal cardiac output and hemodynamic stability  Description: INTERVENTIONS:  - Monitor I/O, vital signs and rhythm  - Monitor for S/S and trends of decreased cardiac output  - Administer and titrate ordered vasoactive medications to optimize hemodynamic stability  - Assess quality of pulses, skin color and temperature  - Assess for signs of decreased coronary artery perfusion  - Instruct patient to report change in severity of symptoms  Outcome: Progressing     Problem: METABOLIC, FLUID AND ELECTROLYTES - ADULT  Goal: Fluid  balance maintained  Description: INTERVENTIONS:  - Monitor labs   - Monitor I/O and WT  - Instruct patient on fluid and nutrition as appropriate  - Assess for signs & symptoms of volume excess or deficit  Outcome: Progressing

## 2024-10-15 NOTE — DISCHARGE INSTRUCTIONS
"Patient Education     Preeclampsia   The Basics   Written by the doctors and editors at [facility]   What is preeclampsia? -- This is a dangerous condition some people get when they are pregnant. It usually happens during the second half of pregnancy (after 20 weeks). It can also happen during labor or soon after the baby is born.  People with preeclampsia have high blood pressure. They might also have too much protein in their urine, or problems with organs like the liver, kidneys, brain, eyes, or placenta. (The placenta is the organ that brings the baby nutrients and oxygen and carries away waste.) Plus, the baby might not grow well and be smaller than normal.  What are the symptoms of preeclampsia? -- Most people do not feel any different than usual. Preeclampsia usually does not cause symptoms unless it is severe. Signs and symptoms of severe preeclampsia include:   Bad headache   Changes in vision, like blurry vision, flashes of light, or spots   Belly pain, especially in the upper belly  If you have any of these symptoms, tell your doctor or nurse. You might not have preeclampsia, because these symptoms can also happen in normal pregnancies. But it's important that your doctor knows about them.  How might preeclampsia affect my baby? -- Preeclampsia can:   Slow the growth of the baby   Decrease the amount of amniotic fluid around the baby (this is the liquid that surrounds and protects the baby in the uterus) (figure 1)  Is there a test for preeclampsia? -- Yes. To test for preeclampsia, your doctor or nurse will:   Take your blood pressure   Check your urine for protein during pregnancy  They will also do blood tests to make sure that your organs are working as they should.  When your doctor or nurse tells you your blood pressure, they will say 2 numbers. For instance, your doctor or nurse might say that your blood pressure is \"140 over 90.\" To be diagnosed with preeclampsia, your top number must be 140 or " "higher, or your bottom number must be 90 or higher. Plus, you must have too much protein in your urine or certain problems with 1 or more of your organs.  It is possible to have high blood pressure (above 140/90) during pregnancy without having high protein in the urine or other problems. That is not preeclampsia. Still, if you develop high blood pressure, your doctor will watch you closely. You could develop preeclampsia or other problems related to high blood pressure.  How is preeclampsia treated? -- For preeclampsia that develops during pregnancy, the only cure is to give birth. Your doctor or nurse will talk with you about whether it is better for you to have your baby right away, or to wait. The best decision depends on how severe your preeclampsia is and how many weeks pregnant you are. While giving birth to your baby will cure your preeclampsia, it's also important to give the baby as much time as possible to grow and develop.  If your preeclampsia is not severe:   If you are less than 34 weeks pregnant, your doctor will probably suggest waiting.   If you are between 34 and 37 weeks pregnant, your doctor will talk to you about your options and help you decide what to do.   If you are more than 37 weeks pregnant, your doctor will probably suggest having your baby.  If the decision is to wait, the doctor will check you and your baby often for any problems. You might need to stay in the hospital until it is time to give birth.  When it is time to have your baby, you will probably get medicine to start contractions. This is called \"inducing labor.\" Most people can have a vaginal birth. But in some cases, the doctor will need to do surgery used to get the baby out of the uterus. This is called \" birth.\"  If your preeclampsia is severe, you will probably need to have your baby as soon as possible. You will also get medicine to lower your blood pressure, if it is very high. This is to keep you from having a " stroke.  Rarely, people with preeclampsia have seizures. Your doctor or nurse might give you medicine during labor to prevent this.  What can I do to prevent preeclampsia? -- You can't do anything to keep from getting preeclampsia. The most important thing you can do is to go to all of the appointments you have with your doctor, nurse, or midwife. That way, they can find out as soon as possible if your blood pressure goes up, or if you have too much protein in your urine or any other problems.  If you are at high risk for preeclampsia, your doctor might tell you to take low-dose aspirin during your second and third trimesters of pregnancy (after 12 weeks). You are at high risk if:   You had preeclampsia before, and your baby was born early.   You are pregnant with twins.   You have high blood pressure even when you are not pregnant.  There are also other conditions that increase your risk. Your doctor can tell you if you are at high risk.  Do not take aspirin or other medicines unless your doctor or nurse tells you that it's safe.  Can preeclampsia cause other health problems? -- If you had preeclampsia, you have a higher chance of getting high blood pressure and heart disease later in life. Tell your primary care doctor or nurse that you had preeclampsia. They can talk to you about how you can lower your chance of getting these health problems in the future. This might include not smoking, eating healthy foods, keeping a healthy weight, and being active.  When should I call the doctor? -- Call your doctor or nurse right away if you have any symptoms of severe preeclampsia, like:   Bad headache   Changes in vision   Belly pain   New shortness of breath  You should also call if you are pregnant and:   Have any bleeding from your vagina   Notice your baby moving less than usual   Think that you might be in labor  All topics are updated as new evidence becomes available and our peer review process is complete.  This  topic retrieved from [employer] on: Mar 28, 2024.  Topic 21154 Version 15.0  Release: 32.2.4 - C32.86  © 2024 UpToDate, Inc. and/or its affiliates. All rights reserved.  figure 1: Pregnancy terms     This drawing shows a baby inside the uterus late in pregnancy.  Graphic 54393 Version 7.0  Consumer Information Use and Disclaimer   Disclaimer: This generalized information is a limited summary of diagnosis, treatment, and/or medication information. It is not meant to be comprehensive and should be used as a tool to help the user understand and/or assess potential diagnostic and treatment options. It does NOT include all information about conditions, treatments, medications, side effects, or risks that may apply to a specific patient. It is not intended to be medical advice or a substitute for the medical advice, diagnosis, or treatment of a health care provider based on the health care provider's examination and assessment of a patient's specific and unique circumstances. Patients must speak with a health care provider for complete information about their health, medical questions, and treatment options, including any risks or benefits regarding use of medications. This information does not endorse any treatments or medications as safe, effective, or approved for treating a specific patient. UpToDate, Inc. and its affiliates disclaim any warranty or liability relating to this information or the use thereof.The use of this information is governed by the Terms of Use, available at https://www.woltersSanouwer.com/en/know/clinical-effectiveness-terms. 2024© UpToDate, Inc. and its affiliates and/or licensors. All rights reserved.  Copyright   © 2024 UpToDate, Inc. and/or its affiliates. All rights reserved.

## 2024-10-15 NOTE — QUICK NOTE
Patient with /93 after receiving Procardia XL 90mg this morning, continues to deny all symptoms of preeclampsia. Will start labetalol 200mg at least daily with plan to increase to BID or TID dosing if BP persistently elevated. Dose of lasix 20mg also ordered. Patient is bottle feeding so if not able to be controlled, there are other medication options as well. Patient is also reporting anxiety so will give a dose of atarax.     Discussed with Dr. Tony Del Rio MD   OB/Gyn PGY-4  1:07 PM  10/15/24

## 2024-10-15 NOTE — ASSESSMENT & PLAN NOTE
CBC/CMP wnl  Systolic (12hrs), Av , Min:124 , Max:156   Diastolic (12hrs), Av, Min:63, Max:79  - s/p magnesium sulfate x24hrs postpartum  - current med regimen: Procardia 60mg qd  - last SRBP early AM on 10/14  - continue to monitor BP and for preeclampsia symptoms    If discharged, will order BP cuff and need BP check in office this week

## 2024-10-16 NOTE — UTILIZATION REVIEW
NOTIFICATION OF ADMISSION DISCHARGE   This is a Notification of Discharge from [facility]. Please be advised that this patient has been discharge from our facility. Below you will find the admission and discharge date and time including the patient’s disposition.   UTILIZATION REVIEW CONTACT:  Sruthi Irizarry  Utilization   Network Utilization Review Department  Phone: [phone] x carefully listen to the prompts. All voicemails are confidential.  Email: [email or web address]     ADMISSION INFORMATION  PRESENTATION DATE: 10/12/2024  5:01 PM  OBERVATION ADMISSION DATE: N/A  INPATIENT ADMISSION DATE: 10/12/24  5:01 PM   DISCHARGE DATE: 10/15/2024  9:41 PM   DISPOSITION:Home/Self Care    Network Utilization Review Department  ATTENTION: Please call with any questions or concerns to [phone] and carefully listen to the prompts so that you are directed to the right person. All voicemails are confidential.   For Discharge needs, contact Care Management DC Support Team at [phone] opt. 2  Send all requests for admission clinical reviews, approved or denied determinations and any other requests to dedicated fax number below belonging to the campus where the patient is receiving treatment. List of dedicated fax numbers for the Facilities:  FACILITY NAME UR FAX NUMBER   ADMISSION DENIALS (Administrative/Medical Necessity) [phone]   DISCHARGE SUPPORT TEAM ([facility]) [phone]   PARENT CHILD HEALTH (Maternity/NICU/Pediatrics) [phone]   [facility] [phone]   [facility] [phone]   [facility] [phone]   [facility] [phone]   [facility] [phone]   [facility] [phone]   [facility] [phone]   [facility] [phone]    [facility] [phone]   [facility] [phone]   [facility] [phone]   [facility] [phone]

## 2024-10-17 PROCEDURE — 88307 TISSUE EXAM BY PATHOLOGIST: CPT | Performed by: PATHOLOGY

## 2024-10-18 ENCOUNTER — TELEPHONE (OUTPATIENT)
Dept: OBGYN CLINIC | Facility: CLINIC | Age: 34
End: 2024-10-18

## 2024-10-18 NOTE — TELEPHONE ENCOUNTER
Patient calls stating she delivered her baby 6 days ago and while in hospital tested positive for syphilis. Our office was recommended for follow up and dose #2, & #3 of Penicillin. Pt has out of state insurance ([place] Medicaid) I recommended she follow up with [facility] in [place]. Provider in office sent message to [facility] clinical team to set up care. Pt agreeable with plan.

## 2024-10-22 ENCOUNTER — TELEPHONE (OUTPATIENT)
Age: 34
End: 2024-10-22

## 2024-10-22 ENCOUNTER — TELEPHONE (OUTPATIENT)
Dept: SURGERY | Facility: CLINIC | Age: 34
End: 2024-10-22

## 2024-10-22 NOTE — TELEPHONE ENCOUNTER
----- Message from Rhoda Cole MD sent at 10/22/2024  3:55 PM EDT -----  Hi Dr. Yo,    We can certainly see her. She has not made appointment yet. I will add our  to help assist schedule this patient.    Do you know if the [facility] department of health is involved?    Best Regards,    Rhoda  ----- Message -----  From: Areli Yo MD  Sent: 10/18/2024  10:23 AM EDT  To: Rhoda Cole MD; #    Hello,        This pt is post partum,+syphilis, had her first dose in the hospital.  She needs her follow up doses and a post partum exam.  She has [place] insurance.  She would like to follow up with your clinic.  She has given our # as a default. She has had no prenatal care.    Thanks!     Dr GAXIOLA

## 2024-10-22 NOTE — TELEPHONE ENCOUNTER
Patient's partner called today and patient was in backround so I was able to speak with her.   She delivered a baby on 10/12/24. Had not had any prenatal care and while at hospital they did blood work and her RPR was reactive. Titer was 4 dils on 10/12/24. Chart reviewed and there was  non reactive RPR on 2/2/19.    She was given 1st dose of Bicillin on 10/15/24 and was told she needed follow up for the next 2 injections.   She was referred to [facility] in [place] but her and partner wanted to come to [place] [facility] for treatment. She is due for 2nd injection today. I discussed with Dr. Solorio and asked what the window between injections could be since she will be 9 days between injection 1 and 2 and he confirmed she is OK for injection #2 on 10/24/24.

## 2024-10-24 ENCOUNTER — SEXUAL HEALTH (OUTPATIENT)
Dept: SURGERY | Facility: CLINIC | Age: 34
End: 2024-10-24

## 2024-10-24 DIAGNOSIS — Z11.3 SCREENING FOR STD (SEXUALLY TRANSMITTED DISEASE): Primary | ICD-10-CM

## 2024-10-24 DIAGNOSIS — A52.9: ICD-10-CM

## 2024-10-24 NOTE — PROGRESS NOTES
Assessment/Plan:  She was recently screened for STD's and HIV during hospitalization this month.  No STD screening was done at this visit.     Syphilis 2nd Benzathine 2.4 million IM X 1 dose today and 3rd dose for 10/31  No sex for 1 week  Practicing safe sex using a condom for each sexually encounter.  Condoms offer the best protection against STD's by acting as a physical barrier to prevent the exchange of semen, vaginal fluids, and blood between partners.  Condoms are not a 100% effective in protection.    Reducing the number of sexual partners can also help to reduce the risk of the transmission of STD's along with regular STD screening.        Problem List Items Addressed This Visit    None  Visit Diagnoses       Screening for STD (sexually transmitted disease)    -  Primary    Syphilis, late                  Subjective:      Patient ID: Hermelinda Smart is a 34 y.o. female.    SHAN Shen presents to STD clinic for treatment of syphilis.  She delivery a baby boy 10 days at home and the was transported to [facility] for care and her son is currently in the NICU with plans to discharge home tomorrow.  She did not have an prenatal care and was found to have syphilis I.  They gave her first Benzathine 2.4 Million IM injection on 10/15/24.  She is here to receive her 2nd and 3rd benzathine doses.  Per pt, she tolerated first dose without any complications.         The following portions of the patient's history were reviewed and updated as appropriate: allergies and past social history.    Review of Systems   Genitourinary:  Positive for vaginal bleeding.         Objective:      LMP  (Approximate)          Physical Exam  Nursing note reviewed.   Genitourinary:     Comments: Deferred.                CHIEF CONCERN(S)    Delivered 10/12/24 - no prenatal care - labs showed syphilis 1:4 dils - got first injection 10/15/24.    No LMP recorded (approximate).    LPS Postpartum    Birth Control Method None -  delivered 10/12/24    Condom Used: No     Sexual Preference :  Male    Date of Last Sexual Exposure: Pre delivery    # of Partners: Last 30 days 1, Last 90 days 1, and Last Year 1    Sexual Practices: Oral and Vaginal      Previously Sexually Transmitted Diseases  Type Date Source of Care Treatment Comment   Syphilis  10/12/24 One shot last week [facility]                      Test Date Results   RPR     HIC     GC     CT           1. CURRENT RISK BEHAVIOR(S)    Other - none     PREVIOUS SUCCESSES    Used condoms when having sex, Maintained a monogamous relationship with only one partner, Practiced abstinence, and Discussed condom use prior to having sex with partner(s)        3. SAFER GOAL BEHAVIOR(S)    Always use condoms to have sex, Practice abstinence, and Practice monogamy          4. PERSON ACTION PLAN:    > BARRIERS:    No condom use or discussions and Get involved in a monogamist relationship    > BENEFITS:    Obtain free condoms from [facility] to decrease STD exposure and Provides a healthier sexual relationship and can reduce STD's        > ACTION STEPS:      Use condoms at least 50% of the time and steadily increase over time until condom use is 100% of the time, Choose to abstain from sex, Discuss condom use prior to having sex with partner(s), and Get tested is an exposure occurred such as a condom breaks/ leaked          4. REFERRALS:  Recent HIV Test

## 2024-10-24 NOTE — PROGRESS NOTES
Patient given Bicillin 2.4 million units in left Glute. This was shot #2 of 3 and she will come back next week for injection #3.     NDC 33957-599-10  Lot [id number]  Exp 09/30/26    Reported in [employer] and faxed to AGUSTINA WATT as well

## 2024-10-31 ENCOUNTER — OFFICE VISIT (OUTPATIENT)
Age: 34
End: 2024-10-31

## 2024-10-31 ENCOUNTER — SEXUAL HEALTH (OUTPATIENT)
Dept: SURGERY | Facility: CLINIC | Age: 34
End: 2024-10-31

## 2024-10-31 VITALS
TEMPERATURE: 98.6 F | HEIGHT: 64 IN | OXYGEN SATURATION: 98 % | BODY MASS INDEX: 32.95 KG/M2 | DIASTOLIC BLOOD PRESSURE: 97 MMHG | HEART RATE: 94 BPM | SYSTOLIC BLOOD PRESSURE: 190 MMHG | RESPIRATION RATE: 20 BRPM | WEIGHT: 193 LBS

## 2024-10-31 DIAGNOSIS — I10 PRIMARY HYPERTENSION: ICD-10-CM

## 2024-10-31 DIAGNOSIS — A53.0 POSITIVE RPR TEST: ICD-10-CM

## 2024-10-31 DIAGNOSIS — F41.9 ANXIETY: ICD-10-CM

## 2024-10-31 DIAGNOSIS — Z30.09: ICD-10-CM

## 2024-10-31 DIAGNOSIS — Z76.89 ENCOUNTER TO ESTABLISH CARE: ICD-10-CM

## 2024-10-31 DIAGNOSIS — Z23 ENCOUNTER FOR IMMUNIZATION: ICD-10-CM

## 2024-10-31 DIAGNOSIS — D50.8 IRON DEFICIENCY ANEMIA SECONDARY TO INADEQUATE DIETARY IRON INTAKE: ICD-10-CM

## 2024-10-31 DIAGNOSIS — A52.9: Primary | ICD-10-CM

## 2024-10-31 PROCEDURE — 90472 IMMUNIZATION ADMIN EACH ADD: CPT | Performed by: OBSTETRICS & GYNECOLOGY

## 2024-10-31 PROCEDURE — 99204 OFFICE O/P NEW MOD 45 MIN: CPT | Performed by: OBSTETRICS & GYNECOLOGY

## 2024-10-31 PROCEDURE — 90710 MMRV VACCINE SC: CPT | Performed by: OBSTETRICS & GYNECOLOGY

## 2024-10-31 PROCEDURE — 90715 TDAP VACCINE 7 YRS/> IM: CPT | Performed by: OBSTETRICS & GYNECOLOGY

## 2024-10-31 PROCEDURE — 90471 IMMUNIZATION ADMIN: CPT | Performed by: OBSTETRICS & GYNECOLOGY

## 2024-10-31 PROCEDURE — 90656 IIV3 VACC NO PRSV 0.5 ML IM: CPT | Performed by: OBSTETRICS & GYNECOLOGY

## 2024-10-31 RX ORDER — SERTRALINE HYDROCHLORIDE 25 MG/1
25 TABLET, FILM COATED ORAL DAILY
Qty: 90 TABLET | Refills: 0 | Status: SHIPPED | OUTPATIENT
Start: 2024-10-31 | End: 2025-01-29

## 2024-10-31 RX ORDER — OLMESARTAN MEDOXOMIL 40 MG/1
40 TABLET ORAL DAILY
Qty: 30 TABLET | Refills: 5 | Status: SHIPPED | OUTPATIENT
Start: 2024-10-31

## 2024-10-31 RX ORDER — MEDROXYPROGESTERONE ACETATE 150 MG/ML
150 INJECTION, SUSPENSION INTRAMUSCULAR
Qty: 1 ML | Refills: 4 | Status: SHIPPED | OUTPATIENT
Start: 2024-10-31

## 2024-10-31 RX ORDER — FERROUS SULFATE 324(65)MG
324 TABLET, DELAYED RELEASE (ENTERIC COATED) ORAL EVERY OTHER DAY
Qty: 30 TABLET | Refills: 3 | Status: SHIPPED | OUTPATIENT
Start: 2024-10-31

## 2024-10-31 NOTE — ASSESSMENT & PLAN NOTE
Positive FTA-Abs, received PCN G weekly for 3w, first dose given on 10/15 at [facility]. Completed 3rd and final dose of PCN G this morning. Denies symptoms.  Has follow-up blood work appointment scheduled.

## 2024-10-31 NOTE — ASSESSMENT & PLAN NOTE
Pregnancy complicated by preeclampsia s/p unsupervized . Unclear if this is chronic vs gestational. Patient was uncontrolled on Nifedipine 90mg daily and Labetalol 200mg BID.  Discontinued labetalol  Continue Nifedipine 90mg daily  Starting Benicar 40mg daily  Educated on appropriate lifestyle modifications  Advised to return in 1week for repeat BP    Orders:    olmesartan (BENICAR) 40 mg tablet; Take 1 tablet (40 mg total) by mouth daily

## 2024-10-31 NOTE — PROGRESS NOTES
Ambulatory Visit  Name: Hermelinda Smart      : 1990      MRN: [id number]  Encounter Provider: Davie Hemphill MD  Encounter Date: 10/31/2024   Encounter department: [facility]    Assessment & Plan  Primary hypertension  Pregnancy complicated by preeclampsia s/p unsupervized . Unclear if this is chronic vs gestational. Patient was uncontrolled on Nifedipine 90mg daily and Labetalol 200mg BID.  Discontinued labetalol  Continue Nifedipine 90mg daily  Starting Benicar 40mg daily  Educated on appropriate lifestyle modifications  Advised to return in 1week for repeat BP    Orders:    olmesartan (BENICAR) 40 mg tablet; Take 1 tablet (40 mg total) by mouth daily    Encounter for postpartum visit  S/p  at home with delivery of placenta at [facility]. No prenatal care. Received Rhogam. Pregnancy was complicated by syphilis and no prenatal care. Currently doing well, denies abdominal cramping and minimal vaginal bleeding.  Orders:    medroxyPROGESTERone acetate (Depo-Provera) 150 mg/mL injection; Inject 1 mL (150 mg total) into a muscle every 3 (three) months    Positive RPR test  Positive FTA-Abs, received PCN G weekly for 3w, first dose given on 10/15 at [facility]. Completed 3rd and final dose of PCN G this morning. Denies symptoms.  Has follow-up blood work appointment scheduled.          Encounter for emergency contraceptive counseling  Advised to return in 1 week for admin of Depo.  Advised to avoid sexual intercourse as we will need to do urine HCG on next visit before injection  Orders:    medroxyPROGESTERone acetate (Depo-Provera) 150 mg/mL injection; Inject 1 mL (150 mg total) into a muscle every 3 (three) months    Anxiety  [place] score 5, with mild anxiety-like symptoms.  Will continue zoloft.  Advised to stop taking atarax as it may be contributing to her elevated BP  Orders:    sertraline (Zoloft) 25 mg tablet; Take 1 tablet (25 mg total) by mouth  "daily    Iron deficiency anemia secondary to inadequate dietary iron intake  S/p  with postpartum Hgb 10.6  Will continue to monitor  Starting Fe supplementation every other day  Orders:    ferrous sulfate 324 (65 Fe) mg; Take 1 tablet (324 mg total) by mouth every other day    Encounter for immunization    Orders:    influenza vaccine preservative-free 0.5 mL IM (Fluzone, Afluria, Fluarix, Flulaval)    TDAP VACCINE GREATER THAN OR EQUAL TO 8YO IM    Encounter to establish care            History of Present Illness     34 y.o.  37w0d here for postpartum visit after  of baby at home, transfer to [facility] for successful delivery of cord and placenta. Received Rhogam. Her pregnancy is complicated by no prenatal care, states she wasn't aware that she was pregnant.           Review of Systems   Constitutional:  Negative for chills and fever.   HENT:  Negative for ear pain and sore throat.    Eyes:  Negative for pain and visual disturbance.   Respiratory:  Negative for cough and shortness of breath.    Cardiovascular:  Negative for chest pain and palpitations.   Gastrointestinal:  Negative for abdominal pain and vomiting.   Genitourinary:  Negative for dysuria and hematuria.   Musculoskeletal:  Negative for arthralgias and back pain.   Skin:  Negative for color change and rash.   Neurological:  Negative for seizures and syncope.   All other systems reviewed and are negative.          Objective     BP (!) 190/97 (BP Location: Right arm, Patient Position: Sitting, Cuff Size: Standard)   Pulse 94   Temp 98.6 °F (37 °C) (Tympanic)   Resp 20   Ht 5' 4\" (1.626 m)   Wt 87.5 kg (193 lb)   LMP  (Approximate)   SpO2 98%   BMI 33.13 kg/m²     Physical Exam  Vitals and nursing note reviewed.   Constitutional:       General: She is not in acute distress.     Appearance: She is well-developed.   HENT:      Head: Normocephalic and atraumatic.   Eyes:      Conjunctiva/sclera: Conjunctivae normal. "   Cardiovascular:      Rate and Rhythm: Normal rate and regular rhythm.      Pulses: Normal pulses.      Heart sounds: Normal heart sounds. No murmur heard.  Pulmonary:      Effort: Pulmonary effort is normal. No respiratory distress.      Breath sounds: Normal breath sounds.   Abdominal:      Palpations: Abdomen is soft.      Tenderness: There is no abdominal tenderness.   Musculoskeletal:         General: No swelling.      Cervical back: Neck supple.   Skin:     General: Skin is warm and dry.      Capillary Refill: Capillary refill takes less than 2 seconds.   Neurological:      General: No focal deficit present.      Mental Status: She is alert and oriented to person, place, and time.   Psychiatric:         Mood and Affect: Mood normal.

## 2024-10-31 NOTE — ASSESSMENT & PLAN NOTE
[place] score 5, with mild anxiety-like symptoms.  Will continue zoloft.  Advised to stop taking atarax as it may be contributing to her elevated BP  Orders:    sertraline (Zoloft) 25 mg tablet; Take 1 tablet (25 mg total) by mouth daily

## 2024-10-31 NOTE — PROGRESS NOTES
Here for 3rd Bicillin injection. Given in right glute. Tolerated injection well. No complaints about previous injection site.     Bicillin  2.4 million units  NDC 46398-834-96  LOT: [id number]  Exp 9/30/2026    Patient has appt scheduled in clinic for retesing. Will return before then if concerns.     Logged in [employer] OGK-5

## 2025-02-17 NOTE — PROGRESS NOTES
This encounter was created for [facility] orders only .   Patient was here for vaccine only.  Not seen by a provider.

## 2025-03-27 ENCOUNTER — SEXUAL HEALTH (OUTPATIENT)
Dept: SURGERY | Facility: CLINIC | Age: 35
End: 2025-03-27

## 2025-03-27 DIAGNOSIS — A53.9 SYPHILIS: Primary | ICD-10-CM

## 2025-03-27 DIAGNOSIS — Z11.3 SCREENING FOR STDS (SEXUALLY TRANSMITTED DISEASES): ICD-10-CM

## 2025-03-27 PROCEDURE — PBNCHG PB NO CHARGE PLACEHOLDER: Performed by: INTERNAL MEDICINE

## 2025-03-27 NOTE — PROGRESS NOTES
Assessment/Plan:    Urine collected for GC/CT testing.  Blood collected for HIV/syphilis testing.  Recommend safer sex practices including 100% condom use.  Recommend regular STD testing.  Follow up 1 week for results.         Problem List Items Addressed This Visit    None  Visit Diagnoses         Syphilis    -  Primary      Screening for STDs (sexually transmitted diseases)                Positive for syphilis in Oct 2024- treated with Bicillin 2.4 mil U x3. Repeat RPR today decreased to 1:2 from 1:4. No treatment at this time.   Subjective:      Patient ID: Hermelinda Smart is a 35 y.o. female.    Patient presents to STD clinic for STD testing. Patient has tested positive for syphilis in Oct of 2024 and has been treated with Bicillin 2.4 mil U weekly for 3 doses. Reports having one male partner with no condom use. Partner has also been treated at the same time. Today patient denies any symptoms.       The following portions of the patient's history were reviewed and updated as appropriate: allergies, current medications, past family history, past medical history, past social history, past surgical history, and problem list.    Review of Systems   Constitutional:  Negative for chills, diaphoresis, fatigue and fever.   Genitourinary:  Negative for decreased urine volume, difficulty urinating, dyspareunia, dysuria, frequency, genital sores, hematuria, pelvic pain, urgency, vaginal bleeding, vaginal discharge and vaginal pain.   Skin:  Negative for rash and wound.   Hematological:  Negative for adenopathy.         Objective:      There were no vitals taken for this visit.         Physical Exam  Nursing note reviewed.   Constitutional:       General: She is not in acute distress.     Appearance: Normal appearance. She is not ill-appearing, toxic-appearing or diaphoretic.   HENT:      Head: Normocephalic and atraumatic.   Eyes:      General: No scleral icterus.  Neurological:      Mental Status: She is alert and  oriented to person, place, and time.      Gait: Gait normal.   Psychiatric:         Behavior: Behavior normal.         Thought Content: Thought content normal.         Judgment: Judgment normal.                CHIEF CONCERN(S)        No LMP recorded.    Condom Used: Occasionally    Sexual Preference :  Male    Date of Last Sexual Exposure: 3/24/25    # of Partners: Last 30 days 1, Last 90 days 1, and Last Year 1    Sexual Practices: Vaginal      Previously Sexually Transmitted Diseases  Type Date Source of Care Treatment Comment   [facility] 10[facility]24 [facility] Bicillin                      Test Date Results   RPR     HIV     GC     CT           1. CURRENT RISK BEHAVIOR(S)    Unprotected sex with multiple/anonymous partners and Other     PREVIOUS SUCCESSES    Used condoms when having sex and Discussed condom use prior to having sex with partner(s)        3. SAFER GOAL BEHAVIOR(S)    Always use condoms to have sex and Get tested if condom breaks/ leaks          4. PERSON ACTION PLAN:    > BARRIERS:    No condom use or discussions    > BENEFITS:    Obtain free condoms from [facility] to decrease STD exposure and Provides a healthier sexual relationship and can reduce STD's        > ACTION STEPS:      Discuss condom use prior to having sex with partner(s)          4. REFERRALS:

## 2025-04-03 ENCOUNTER — SEXUAL HEALTH (OUTPATIENT)
Dept: SURGERY | Facility: CLINIC | Age: 35
End: 2025-04-03

## 2025-04-03 DIAGNOSIS — Z71.2 ENCOUNTER TO DISCUSS TEST RESULTS: Primary | ICD-10-CM

## 2025-04-03 LAB
EXTERNAL CHLAMYDIA RESULT: NEGATIVE
EXTERNAL HIV-1/2 AB-AG: NORMAL
N GONORRHOEA RRNA SPEC QL PROBE: NEGATIVE
RPR SER QL: REACTIVE
RPR SER-TITR: ABNORMAL {TITER}

## 2025-04-03 PROCEDURE — PBNCHG PB NO CHARGE PLACEHOLDER
